# Patient Record
Sex: MALE | Race: WHITE | Employment: UNEMPLOYED | URBAN - METROPOLITAN AREA
[De-identification: names, ages, dates, MRNs, and addresses within clinical notes are randomized per-mention and may not be internally consistent; named-entity substitution may affect disease eponyms.]

---

## 2019-11-22 ENCOUNTER — OFFICE VISIT (OUTPATIENT)
Dept: PODIATRY | Age: 79
End: 2019-11-22
Payer: MEDICARE

## 2019-11-22 VITALS — HEIGHT: 71 IN | BODY MASS INDEX: 25.2 KG/M2 | WEIGHT: 180 LBS

## 2019-11-22 DIAGNOSIS — M79.671 PAIN IN RIGHT FOOT: ICD-10-CM

## 2019-11-22 DIAGNOSIS — M72.2 PLANTAR FASCIITIS OF RIGHT FOOT: Primary | ICD-10-CM

## 2019-11-22 PROCEDURE — 1036F TOBACCO NON-USER: CPT | Performed by: PODIATRIST

## 2019-11-22 PROCEDURE — G8419 CALC BMI OUT NRM PARAM NOF/U: HCPCS | Performed by: PODIATRIST

## 2019-11-22 PROCEDURE — 1123F ACP DISCUSS/DSCN MKR DOCD: CPT | Performed by: PODIATRIST

## 2019-11-22 PROCEDURE — G8427 DOCREV CUR MEDS BY ELIG CLIN: HCPCS | Performed by: PODIATRIST

## 2019-11-22 PROCEDURE — G8484 FLU IMMUNIZE NO ADMIN: HCPCS | Performed by: PODIATRIST

## 2019-11-22 PROCEDURE — 4040F PNEUMOC VAC/ADMIN/RCVD: CPT | Performed by: PODIATRIST

## 2019-11-22 PROCEDURE — 99203 OFFICE O/P NEW LOW 30 MIN: CPT | Performed by: PODIATRIST

## 2019-11-22 ASSESSMENT — ENCOUNTER SYMPTOMS
BACK PAIN: 0
DIARRHEA: 0
COLOR CHANGE: 0
NAUSEA: 0
SHORTNESS OF BREATH: 0

## 2019-12-12 ENCOUNTER — ANESTHESIA EVENT (OUTPATIENT)
Dept: ENDOSCOPY | Age: 79
End: 2019-12-12
Payer: MEDICARE

## 2019-12-12 ENCOUNTER — HOSPITAL ENCOUNTER (OUTPATIENT)
Age: 79
Setting detail: OUTPATIENT SURGERY
Discharge: HOME | End: 2019-12-12
Attending: INTERNAL MEDICINE | Admitting: INTERNAL MEDICINE
Payer: MEDICARE

## 2019-12-12 ENCOUNTER — ANESTHESIA (OUTPATIENT)
Dept: ENDOSCOPY | Age: 79
End: 2019-12-12
Payer: MEDICARE

## 2019-12-12 ENCOUNTER — HOSPITAL ENCOUNTER (OUTPATIENT)
Dept: ULTRASOUND IMAGING | Age: 79
Discharge: HOME | End: 2019-12-14
Attending: INTERNAL MEDICINE
Payer: MEDICARE

## 2019-12-12 VITALS
OXYGEN SATURATION: 96 % | HEIGHT: 70 IN | SYSTOLIC BLOOD PRESSURE: 129 MMHG | HEART RATE: 65 BPM | BODY MASS INDEX: 25.77 KG/M2 | WEIGHT: 180 LBS | DIASTOLIC BLOOD PRESSURE: 83 MMHG | RESPIRATION RATE: 18 BRPM | TEMPERATURE: 98 F

## 2019-12-12 VITALS
RESPIRATION RATE: 14 BRPM | OXYGEN SATURATION: 95 % | DIASTOLIC BLOOD PRESSURE: 67 MMHG | SYSTOLIC BLOOD PRESSURE: 95 MMHG

## 2019-12-12 DIAGNOSIS — R10.9 ABDOMINAL PAIN, UNSPECIFIED ABDOMINAL LOCATION: ICD-10-CM

## 2019-12-12 LAB — GLUCOSE BLD-MCNC: 102 MG/DL (ref 75–110)

## 2019-12-12 PROCEDURE — 76975 GI ENDOSCOPIC ULTRASOUND: CPT

## 2019-12-12 PROCEDURE — 82947 ASSAY GLUCOSE BLOOD QUANT: CPT

## 2019-12-12 PROCEDURE — 3609012400 HC EGD TRANSORAL BIOPSY SINGLE/MULTIPLE: Performed by: INTERNAL MEDICINE

## 2019-12-12 PROCEDURE — 7100000011 HC PHASE II RECOVERY - ADDTL 15 MIN: Performed by: INTERNAL MEDICINE

## 2019-12-12 PROCEDURE — 88305 TISSUE EXAM BY PATHOLOGIST: CPT

## 2019-12-12 PROCEDURE — 3700000001 HC ADD 15 MINUTES (ANESTHESIA): Performed by: INTERNAL MEDICINE

## 2019-12-12 PROCEDURE — 3700000000 HC ANESTHESIA ATTENDED CARE: Performed by: INTERNAL MEDICINE

## 2019-12-12 PROCEDURE — 3609018500 HC EGD US SCOPE W/ADJACENT STRUCTURES: Performed by: INTERNAL MEDICINE

## 2019-12-12 PROCEDURE — 2500000003 HC RX 250 WO HCPCS: Performed by: NURSE ANESTHETIST, CERTIFIED REGISTERED

## 2019-12-12 PROCEDURE — 2580000003 HC RX 258: Performed by: INTERNAL MEDICINE

## 2019-12-12 PROCEDURE — 7100000010 HC PHASE II RECOVERY - FIRST 15 MIN: Performed by: INTERNAL MEDICINE

## 2019-12-12 PROCEDURE — 6360000002 HC RX W HCPCS: Performed by: NURSE ANESTHETIST, CERTIFIED REGISTERED

## 2019-12-12 PROCEDURE — 36415 COLL VENOUS BLD VENIPUNCTURE: CPT

## 2019-12-12 PROCEDURE — 2709999900 HC NON-CHARGEABLE SUPPLY: Performed by: INTERNAL MEDICINE

## 2019-12-12 RX ORDER — PROPOFOL 10 MG/ML
INJECTION, EMULSION INTRAVENOUS PRN
Status: DISCONTINUED | OUTPATIENT
Start: 2019-12-12 | End: 2019-12-12 | Stop reason: SDUPTHER

## 2019-12-12 RX ORDER — SODIUM CHLORIDE 9 MG/ML
INJECTION, SOLUTION INTRAVENOUS CONTINUOUS
Status: DISCONTINUED | OUTPATIENT
Start: 2019-12-12 | End: 2019-12-12 | Stop reason: HOSPADM

## 2019-12-12 RX ORDER — LIDOCAINE HYDROCHLORIDE 10 MG/ML
INJECTION, SOLUTION EPIDURAL; INFILTRATION; INTRACAUDAL; PERINEURAL PRN
Status: DISCONTINUED | OUTPATIENT
Start: 2019-12-12 | End: 2019-12-12 | Stop reason: SDUPTHER

## 2019-12-12 RX ORDER — PROPOFOL 10 MG/ML
INJECTION, EMULSION INTRAVENOUS CONTINUOUS PRN
Status: DISCONTINUED | OUTPATIENT
Start: 2019-12-12 | End: 2019-12-12 | Stop reason: SDUPTHER

## 2019-12-12 RX ADMIN — LIDOCAINE HYDROCHLORIDE 50 MG: 10 INJECTION, SOLUTION EPIDURAL; INFILTRATION; INTRACAUDAL at 10:53

## 2019-12-12 RX ADMIN — PROPOFOL 100 MG: 10 INJECTION, EMULSION INTRAVENOUS at 10:56

## 2019-12-12 RX ADMIN — PROPOFOL 100 MCG/KG/MIN: 10 INJECTION, EMULSION INTRAVENOUS at 10:56

## 2019-12-12 RX ADMIN — SODIUM CHLORIDE: 9 INJECTION, SOLUTION INTRAVENOUS at 08:55

## 2019-12-12 ASSESSMENT — PAIN SCALES - GENERAL
PAINLEVEL_OUTOF10: 0

## 2019-12-12 NOTE — PROGRESS NOTES
IV STARTED R HAND 20 ANGIO PER  VIVIANA LOYA RN WITH 500 ML 0.9 NS AT [facility]

## 2019-12-12 NOTE — H&P
History and Physical    Pt Name: Sayra Mirza  MRN: [id number]  YOB: 1940  Date of evaluation: 12/12/2019  Primary Care Physician: Jim Jorge  Patient evaluated at the request of  Dr. Gabriel Vincent    Reason for evaluation:   Pancreatic mass  SUBJECTIVE:   History of Chief Complaint:      Glen Morris is a 70 y.o. male   Who has know about a incidental finding of a pancreatic mass for several years that was found on a work up for a kidney issue , quit smoking years ago  denies any unintentional weight loss, and denies any abdominal pain . He is scheduled for his first EUS today . Past Medical History      has a past medical history of Allergic rhinitis, Caffeine use, Cancer ([facility] [address] Hyperlipidemia, Hypertension, Hypertrophy of prostate with urinary obstruction and other lower urinary tract symptoms (LUTS), and Type II or unspecified type diabetes mellitus without mention of complication, not stated as uncontrolled. Past Surgical History   has a past surgical history that includes Rotator cuff repair; TURP; Prostate surgery; Cystocopy; Prostate Biopsy; joint replacement; and Total knee arthroplasty (Right). Medications   Scheduled Meds:  Continuous Infusions:   sodium chloride 100 mL/hr at 12/12/19 0855     PRN Meds:. Allergies  is allergic to sulfa antibiotics. Family History    family history includes Diabetes in his sister.     Family Status   Relation Name Status    Sister  (Not Specified)         Social History  Social History     Socioeconomic History    Marital status:      Spouse name: Not on file    Number of children: Not on file    Years of education: Not on file    Highest education level: Not on file   Occupational History    Not on file   Social Needs    Financial resource strain: Not on file    Food insecurity:     Worry: Not on file     Inability: Not on file    Transportation needs:     Medical: Not on file     Non-medical: Not on file

## 2019-12-13 LAB — SURGICAL PATHOLOGY REPORT: NORMAL

## 2023-08-28 ENCOUNTER — HOSPITAL ENCOUNTER (OUTPATIENT)
Dept: CT IMAGING | Age: 83
Discharge: HOME | End: 2023-08-30
Attending: FAMILY MEDICINE
Payer: MEDICARE

## 2023-08-28 DIAGNOSIS — E11.649 TYPE 2 DIABETES MELLITUS WITH HYPOGLYCEMIA WITHOUT COMA, UNSPECIFIED WHETHER LONG TERM INSULIN USE (HCC): ICD-10-CM

## 2023-08-28 DIAGNOSIS — R41.3 OTHER AMNESIA: ICD-10-CM

## 2023-08-28 PROCEDURE — 70450 CT HEAD/BRAIN W/O DYE: CPT

## 2023-09-13 VITALS
BODY MASS INDEX: 23.66 KG/M2 | HEART RATE: 68 BPM | OXYGEN SATURATION: 97 % | DIASTOLIC BLOOD PRESSURE: 86 MMHG | HEIGHT: 71 IN | TEMPERATURE: 96.8 F | RESPIRATION RATE: 16 BRPM | WEIGHT: 169 LBS | SYSTOLIC BLOOD PRESSURE: 142 MMHG

## 2023-09-13 DIAGNOSIS — N18.2 CHRONIC KIDNEY DISEASE, STAGE II (MILD): ICD-10-CM

## 2023-09-13 DIAGNOSIS — C64.2 MALIGNANT NEOPLASM OF KIDNEY, LEFT (HCC): ICD-10-CM

## 2023-09-13 DIAGNOSIS — E78.2 MIXED HYPERLIPIDEMIA: ICD-10-CM

## 2023-09-13 DIAGNOSIS — F41.1 GENERALIZED ANXIETY DISORDER: ICD-10-CM

## 2023-09-13 DIAGNOSIS — C61 CANCER OF PROSTATE (HCC): ICD-10-CM

## 2023-09-13 DIAGNOSIS — I10 ESSENTIAL (PRIMARY) HYPERTENSION: ICD-10-CM

## 2023-09-13 DIAGNOSIS — E11.9 DIABETES MELLITUS WITHOUT COMPLICATION (HCC): ICD-10-CM

## 2023-09-13 DIAGNOSIS — R41.3 OTHER AMNESIA: ICD-10-CM

## 2023-09-14 ENCOUNTER — OFFICE VISIT (OUTPATIENT)
Age: 83
End: 2023-09-14

## 2023-09-14 VITALS
DIASTOLIC BLOOD PRESSURE: 80 MMHG | HEIGHT: 70 IN | HEART RATE: 75 BPM | BODY MASS INDEX: 24.25 KG/M2 | SYSTOLIC BLOOD PRESSURE: 142 MMHG | WEIGHT: 169.4 LBS

## 2023-09-14 DIAGNOSIS — C80.1 CANCER (HCC): ICD-10-CM

## 2023-09-14 DIAGNOSIS — G30.9 ALZHEIMER'S DISEASE, UNSPECIFIED (CODE) (HCC): ICD-10-CM

## 2023-09-14 DIAGNOSIS — Z78.9 CAFFEINE USE: Primary | ICD-10-CM

## 2023-09-14 DIAGNOSIS — E11.22 TYPE 2 DIABETES MELLITUS WITH STAGE 2 CHRONIC KIDNEY DISEASE, WITHOUT LONG-TERM CURRENT USE OF INSULIN (HCC): ICD-10-CM

## 2023-09-14 DIAGNOSIS — E11.9 DIABETES MELLITUS WITHOUT COMPLICATION (HCC): Primary | ICD-10-CM

## 2023-09-14 DIAGNOSIS — N18.2 TYPE 2 DIABETES MELLITUS WITH STAGE 2 CHRONIC KIDNEY DISEASE, WITHOUT LONG-TERM CURRENT USE OF INSULIN (HCC): ICD-10-CM

## 2023-09-14 DIAGNOSIS — C64.2 CLEAR CELL CARCINOMA OF KIDNEY, LEFT (HCC): ICD-10-CM

## 2023-09-14 PROBLEM — J30.9 ALLERGIC RHINITIS: Status: ACTIVE | Noted: 2023-09-14

## 2023-09-14 LAB — HBA1C MFR BLD: 7.7 %

## 2023-09-14 RX ORDER — HYDROCORTISONE ACETATE 25 MG/1
1 SUPPOSITORY RECTAL
COMMUNITY
Start: 2021-08-12

## 2023-09-14 RX ORDER — DONEPEZIL HYDROCHLORIDE 5 MG/1
5 TABLET, FILM COATED ORAL NIGHTLY
Qty: 30 TABLET | Refills: 1 | Status: SHIPPED | OUTPATIENT
Start: 2023-09-14 | End: 2023-10-14

## 2023-09-14 RX ORDER — TAMSULOSIN HYDROCHLORIDE 0.4 MG/1
CAPSULE ORAL
COMMUNITY
Start: 2019-12-22

## 2023-09-14 RX ORDER — FLUTICASONE PROPIONATE 50 MCG
SPRAY, SUSPENSION (ML) NASAL
COMMUNITY
Start: 2017-03-22

## 2023-09-14 RX ORDER — METFORMIN HYDROCHLORIDE 500 MG/1
TABLET, EXTENDED RELEASE ORAL
Qty: 30 TABLET | Refills: 3 | Status: SHIPPED | OUTPATIENT
Start: 2023-09-14

## 2023-09-14 RX ORDER — BRINZOLAMIDE/BRIMONIDINE TARTRATE 10; 2 MG/ML; MG/ML
1 SUSPENSION/ DROPS OPHTHALMIC EVERY 12 HOURS
COMMUNITY
Start: 2022-09-08

## 2023-09-14 RX ORDER — LOSARTAN POTASSIUM 25 MG/1
TABLET ORAL
COMMUNITY
Start: 2017-03-08

## 2023-09-14 RX ORDER — GLUCOSAMINE SULFATE 500 MG
CAPSULE ORAL
COMMUNITY
Start: 2014-02-11

## 2023-09-14 RX ORDER — DONEPEZIL HYDROCHLORIDE 5 MG/1
TABLET, FILM COATED ORAL
COMMUNITY
Start: 2023-08-25 | End: 2023-09-14 | Stop reason: SDUPTHER

## 2023-09-14 SDOH — ECONOMIC STABILITY: INCOME INSECURITY: HOW HARD IS IT FOR YOU TO PAY FOR THE VERY BASICS LIKE FOOD, HOUSING, MEDICAL CARE, AND HEATING?: NOT HARD AT ALL

## 2023-09-14 SDOH — ECONOMIC STABILITY: FOOD INSECURITY: WITHIN THE PAST 12 MONTHS, YOU WORRIED THAT YOUR FOOD WOULD RUN OUT BEFORE YOU GOT MONEY TO BUY MORE.: NEVER TRUE

## 2023-09-14 SDOH — ECONOMIC STABILITY: FOOD INSECURITY: WITHIN THE PAST 12 MONTHS, THE FOOD YOU BOUGHT JUST DIDN'T LAST AND YOU DIDN'T HAVE MONEY TO GET MORE.: NEVER TRUE

## 2023-09-14 SDOH — ECONOMIC STABILITY: HOUSING INSECURITY
IN THE LAST 12 MONTHS, WAS THERE A TIME WHEN YOU DID NOT HAVE A STEADY PLACE TO SLEEP OR SLEPT IN A SHELTER (INCLUDING NOW)?: NO

## 2023-09-14 ASSESSMENT — PATIENT HEALTH QUESTIONNAIRE - PHQ9
2. FEELING DOWN, DEPRESSED OR HOPELESS: 0
SUM OF ALL RESPONSES TO PHQ9 QUESTIONS 1 & 2: 0
1. LITTLE INTEREST OR PLEASURE IN DOING THINGS: 0
SUM OF ALL RESPONSES TO PHQ QUESTIONS 1-9: 0

## 2023-09-14 NOTE — PROGRESS NOTES
[facility]     Date of Visit:  2023  Patient Name: Shoshana Cummings   Patient :  1940     CHIEF COMPLAINT/HPI:     Shoshana Cummings is a 82 y.o. male who presents today for an general visit to be evaluated for the following condition(s):  Chief Complaint   Patient presents with    Follow-up     6-7 weeks. Recent brain scan was done. Daughter did say that the patient forgot to take the recent medication that was ordered. Recent CT of the brain was reviewed and was unremarkable except for age-related changes the patient did not get labs done. Been taking Aricept sporadically. New lab slip will be issued  REVIEW OF SYSTEM      Review of Systems   no fever no sweats no chills no chest pain or shortness of breath no nausea vomiting no diarrhea patient remains active around the home cuts lawn etc.  Still drives locally. Occasionally he and his wife will drive down to see relatives in [place] without any  difficulty currently patient is not taking any of his medications    REVIEWED INFORMATION      Allergies   Allergen Reactions    Sulfa Antibiotics     Sulfamethoxazole-Trimethoprim Hives       Current Outpatient Medications   Medication Sig Note Dispense Refill    donepezil (ARICEPT) 5 MG tablet Take 1 tablet by mouth nightly  30 tablet 1    Coenzyme Q10 (CO Q 10) 100 MG CAPS 1 capsule with a meal Orally Once a day       fluticasone (FLONASE) 50 MCG/ACT nasal spray PLACE 2 SPRAYS IN EACH NOSTRIL DAILY Nasal       Glucosamine 500 MG CAPS TAKE 1 CAPSULE Daily Oral Daily       losartan (COZAAR) 25 MG tablet TAKE ONE TABLET BY MOUTH ONCE DAILY Oral for 90       brinzolamide-brimonidine (SIMBRINZA) 1-0.2 % SUSP Apply 1 drop to eye in the morning and 1 drop in the evening.        diclofenac (VOLTAREN) 50 MG EC tablet 1 tablet as needed Orally Twice a day with food for 30 days       hydrocortisone (ANUSOL-HC) 25 MG suppository 1 suppository       tamsulosin (FLOMAX) 0.4 MG capsule TAKE 1 CAPSULE EVERY

## 2023-09-15 ENCOUNTER — HOSPITAL ENCOUNTER (OUTPATIENT)
Age: 83
Setting detail: SPECIMEN
Discharge: HOME | End: 2023-09-15

## 2023-09-15 DIAGNOSIS — N18.2 TYPE 2 DIABETES MELLITUS WITH STAGE 2 CHRONIC KIDNEY DISEASE, WITHOUT LONG-TERM CURRENT USE OF INSULIN (HCC): ICD-10-CM

## 2023-09-15 DIAGNOSIS — C64.2 CLEAR CELL CARCINOMA OF KIDNEY, LEFT (HCC): ICD-10-CM

## 2023-09-15 DIAGNOSIS — E11.9 DIABETES MELLITUS WITHOUT COMPLICATION (HCC): ICD-10-CM

## 2023-09-15 DIAGNOSIS — E11.22 TYPE 2 DIABETES MELLITUS WITH STAGE 2 CHRONIC KIDNEY DISEASE, WITHOUT LONG-TERM CURRENT USE OF INSULIN (HCC): ICD-10-CM

## 2023-09-15 LAB
ALBUMIN SERPL-MCNC: 3.9 G/DL (ref 3.5–5.2)
ALBUMIN/GLOB SERPL: 1.1 {RATIO} (ref 1–2.5)
ALP SERPL-CCNC: 144 U/L (ref 40–129)
ALT SERPL-CCNC: 14 U/L (ref 5–41)
ANION GAP SERPL CALCULATED.3IONS-SCNC: 12 MMOL/L (ref 9–17)
AST SERPL-CCNC: 21 U/L
BILIRUB DIRECT SERPL-MCNC: 0.1 MG/DL
BILIRUB INDIRECT SERPL-MCNC: 0.5 MG/DL (ref 0–1)
BILIRUB SERPL-MCNC: 0.6 MG/DL (ref 0.3–1.2)
BUN SERPL-MCNC: 27 MG/DL (ref 8–23)
CALCIUM SERPL-MCNC: 9.1 MG/DL (ref 8.6–10.4)
CHLORIDE SERPL-SCNC: 103 MMOL/L (ref 98–107)
CHOLEST SERPL-MCNC: 202 MG/DL
CHOLESTEROL/HDL RATIO: 4.6
CO2 SERPL-SCNC: 23 MMOL/L (ref 20–31)
CREAT SERPL-MCNC: 1.6 MG/DL (ref 0.7–1.2)
ERYTHROCYTE [DISTWIDTH] IN BLOOD BY AUTOMATED COUNT: 13.3 % (ref 11.8–14.4)
GFR SERPL CREATININE-BSD FRML MDRD: 43 ML/MIN/1.73M2
GLUCOSE SERPL-MCNC: 151 MG/DL (ref 70–99)
HCT VFR BLD AUTO: 44.6 % (ref 40.7–50.3)
HDLC SERPL-MCNC: 44 MG/DL
HGB BLD-MCNC: 14.1 G/DL (ref 13–17)
LDLC SERPL CALC-MCNC: 129 MG/DL (ref 0–130)
MCH RBC QN AUTO: 30.1 PG (ref 25.2–33.5)
MCHC RBC AUTO-ENTMCNC: 31.6 G/DL (ref 28.4–34.8)
MCV RBC AUTO: 95.3 FL (ref 82.6–102.9)
NRBC BLD-RTO: 0 PER 100 WBC
PLATELET # BLD AUTO: 193 K/UL (ref 138–453)
PMV BLD AUTO: 10.5 FL (ref 8.1–13.5)
POTASSIUM SERPL-SCNC: 4.9 MMOL/L (ref 3.7–5.3)
PROT SERPL-MCNC: 7.3 G/DL (ref 6.4–8.3)
RBC # BLD AUTO: 4.68 M/UL (ref 4.21–5.77)
SODIUM SERPL-SCNC: 138 MMOL/L (ref 135–144)
T4 FREE SERPL-MCNC: 1.1 NG/DL (ref 0.9–1.7)
TRIGL SERPL-MCNC: 145 MG/DL
TSH SERPL DL<=0.05 MIU/L-ACNC: 1.33 UIU/ML (ref 0.3–5)
VIT B12 SERPL-MCNC: 322 PG/ML (ref 232–1245)
WBC OTHER # BLD: 6.2 K/UL (ref 3.5–11.3)

## 2023-09-15 NOTE — RESULT ENCOUNTER NOTE
please call daughter fasting sugar was 150.   Not  bad but it would be better if he took his glimepiride see if he can take glimepiride 1/2 tablet a day with breakfast and also the memory pill at bedtime

## 2023-11-13 DIAGNOSIS — I73.9 PERIPHERAL VASCULAR DISEASE (HCC): Primary | ICD-10-CM

## 2024-01-09 ENCOUNTER — OFFICE VISIT (OUTPATIENT)
Age: 84
End: 2024-01-09
Payer: MEDICARE

## 2024-01-09 VITALS
TEMPERATURE: 97.8 F | OXYGEN SATURATION: 98 % | HEIGHT: 70 IN | DIASTOLIC BLOOD PRESSURE: 78 MMHG | BODY MASS INDEX: 24.79 KG/M2 | HEART RATE: 74 BPM | WEIGHT: 173.13 LBS | SYSTOLIC BLOOD PRESSURE: 138 MMHG | RESPIRATION RATE: 16 BRPM

## 2024-01-09 DIAGNOSIS — Z00.00 INITIAL MEDICARE ANNUAL WELLNESS VISIT: Primary | ICD-10-CM

## 2024-01-09 DIAGNOSIS — F03.B0 MODERATE DEMENTIA WITHOUT BEHAVIORAL DISTURBANCE, PSYCHOTIC DISTURBANCE, MOOD DISTURBANCE, OR ANXIETY, UNSPECIFIED DEMENTIA TYPE (HCC): ICD-10-CM

## 2024-01-09 DIAGNOSIS — I10 ESSENTIAL (PRIMARY) HYPERTENSION: ICD-10-CM

## 2024-01-09 DIAGNOSIS — N18.2 CHRONIC KIDNEY DISEASE, STAGE II (MILD): ICD-10-CM

## 2024-01-09 DIAGNOSIS — N18.2 TYPE 2 DIABETES MELLITUS WITH STAGE 2 CHRONIC KIDNEY DISEASE, WITHOUT LONG-TERM CURRENT USE OF INSULIN (HCC): ICD-10-CM

## 2024-01-09 DIAGNOSIS — E78.2 MIXED HYPERLIPIDEMIA: ICD-10-CM

## 2024-01-09 DIAGNOSIS — E11.22 TYPE 2 DIABETES MELLITUS WITH STAGE 2 CHRONIC KIDNEY DISEASE, WITHOUT LONG-TERM CURRENT USE OF INSULIN (HCC): ICD-10-CM

## 2024-01-09 PROCEDURE — G8484 FLU IMMUNIZE NO ADMIN: HCPCS | Performed by: FAMILY MEDICINE

## 2024-01-09 PROCEDURE — 3078F DIAST BP <80 MM HG: CPT | Performed by: FAMILY MEDICINE

## 2024-01-09 PROCEDURE — G0438 PPPS, INITIAL VISIT: HCPCS | Performed by: FAMILY MEDICINE

## 2024-01-09 PROCEDURE — 1123F ACP DISCUSS/DSCN MKR DOCD: CPT | Performed by: FAMILY MEDICINE

## 2024-01-09 PROCEDURE — 3075F SYST BP GE 130 - 139MM HG: CPT | Performed by: FAMILY MEDICINE

## 2024-01-09 RX ORDER — METFORMIN HYDROCHLORIDE 500 MG/1
TABLET, EXTENDED RELEASE ORAL
Qty: 30 TABLET | Refills: 5 | Status: SHIPPED | OUTPATIENT
Start: 2024-01-09

## 2024-01-09 RX ORDER — GLIMEPIRIDE 1 MG/1
1 TABLET ORAL
Qty: 30 TABLET | Refills: 5 | Status: SHIPPED | OUTPATIENT
Start: 2024-01-09

## 2024-01-09 RX ORDER — LOSARTAN POTASSIUM 25 MG/1
25 TABLET ORAL DAILY
Qty: 30 TABLET | Refills: 5 | Status: SHIPPED | OUTPATIENT
Start: 2024-01-09

## 2024-01-09 RX ORDER — PRAVASTATIN SODIUM 40 MG
40 TABLET ORAL
Qty: 30 TABLET | Refills: 5 | Status: SHIPPED | OUTPATIENT
Start: 2024-01-10

## 2024-01-09 RX ORDER — DONEPEZIL HYDROCHLORIDE 5 MG/1
5 TABLET, FILM COATED ORAL NIGHTLY
Qty: 30 TABLET | Refills: 5 | Status: SHIPPED | OUTPATIENT
Start: 2024-01-09 | End: 2024-02-08

## 2024-01-09 ASSESSMENT — PATIENT HEALTH QUESTIONNAIRE - PHQ9
SUM OF ALL RESPONSES TO PHQ QUESTIONS 1-9: 0
1. LITTLE INTEREST OR PLEASURE IN DOING THINGS: 0
SUM OF ALL RESPONSES TO PHQ9 QUESTIONS 1 & 2: 0
2. FEELING DOWN, DEPRESSED OR HOPELESS: 0
SUM OF ALL RESPONSES TO PHQ QUESTIONS 1-9: 0

## 2024-01-09 ASSESSMENT — LIFESTYLE VARIABLES
HOW MANY STANDARD DRINKS CONTAINING ALCOHOL DO YOU HAVE ON A TYPICAL DAY: 3 OR 4
HOW OFTEN DO YOU HAVE A DRINK CONTAINING ALCOHOL: 2-4 TIMES A MONTH

## 2024-01-09 NOTE — PROGRESS NOTES
Medicare Annual Wellness Visit    Stevan Dolan is here for Medicare AWV (Patient is here today for an AWV)    Assessment & Plan   Initial Medicare annual wellness visit  Recommendations for Preventive Services Due: see orders and patient instructions/AVS.  Recommended screening schedule for the next 5-10 years is provided to the patient in written form: see Patient Instructions/AVS.     Return in 3 months (on 4/9/2024).     Subjective       Patient's complete Health Risk Assessment and screening values have been reviewed and are found in Flowsheets. The following problems were reviewed today and where indicated follow up appointments were made and/or referrals ordered.    Positive Risk Factor Screenings with Interventions:                 Dentist Screen:  Have you seen the dentist within the past year?: (!) No    Intervention:  Patient reminded to get regular eye exams contact information for local ophthalmologist given to patient and his friend.  Patient has not been taking medicine for may be a month or so nobody knows for certain     Vision Screen:  Do you have difficulty driving, watching TV, or doing any of your daily activities because of your eyesight?: No  Have you had an eye exam within the past year?: (!) No  No results found.    Interventions:  Patient reminded to get regular eye exams contact information given check feet daily he has no neuropathy      Advanced Directives:  Do you have a Living Will?: (!) No    Intervention:                       Objective   Vitals:    01/09/24 0923   BP: 138/78   Pulse: 74   Resp: 16   Temp: 97.8 °F (36.6 °C)   SpO2: 98%   Weight: 78.5 kg (173 lb 2 oz)   Height: 1.778 m (5' 10\")      Body mass index is 24.84 kg/m².   Blood pressure 138/78 weight 173 pounds.  Patient is in no distress he is a little vague on details however.  Neck no masses trachea midline chest clear heart regular rhythm no murmur gallop or click.  Abdomen soft nontender no organomegaly no peripheral

## 2024-01-09 NOTE — PATIENT INSTRUCTIONS
benefits include a comprehensive review of your medical history including lifestyle, illnesses that may run in your family, and various assessments and screenings as appropriate.    After reviewing your medical record and screening and assessments performed today your provider may have ordered immunizations, labs, imaging, and/or referrals for you.  A list of these orders (if applicable) as well as your Preventive Care list are included within your After Visit Summary for your review.    Other Preventive Recommendations:    A preventive eye exam performed by an eye specialist is recommended every 1-2 years to screen for glaucoma; cataracts, macular degeneration, and other eye disorders.  A preventive dental visit is recommended every 6 months.  Try to get at least 150 minutes of exercise per week or 10,000 steps per day on a pedometer .  Order or download the FREE \"Exercise & Physical Activity: Your Everyday Guide\" from [employer]. Call 1-356.135.9132 or search [employer] online.  You need 3538-6137 mg of calcium and 5975-3085 IU of vitamin D per day. It is possible to meet your calcium requirement with diet alone, but a vitamin D supplement is usually necessary to meet this goal.  When exposed to the sun, use a sunscreen that protects against both UVA and UVB radiation with an SPF of 30 or greater. Reapply every 2 to 3 hours or after sweating, drying off with a towel, or swimming.  Always wear a seat belt when traveling in a car. Always wear a helmet when riding a bicycle or motorcycle.

## 2024-02-28 ENCOUNTER — TELEPHONE (OUTPATIENT)
Age: 84
End: 2024-02-28

## 2024-02-28 DIAGNOSIS — E11.22 TYPE 2 DIABETES MELLITUS WITH STAGE 2 CHRONIC KIDNEY DISEASE, WITHOUT LONG-TERM CURRENT USE OF INSULIN (HCC): Primary | ICD-10-CM

## 2024-02-28 DIAGNOSIS — E78.2 MIXED HYPERLIPIDEMIA: ICD-10-CM

## 2024-02-28 DIAGNOSIS — I10 ESSENTIAL (PRIMARY) HYPERTENSION: ICD-10-CM

## 2024-02-28 DIAGNOSIS — R53.83 OTHER FATIGUE: ICD-10-CM

## 2024-02-28 DIAGNOSIS — C64.2 CLEAR CELL CARCINOMA OF KIDNEY, LEFT (HCC): ICD-10-CM

## 2024-02-28 DIAGNOSIS — N18.2 TYPE 2 DIABETES MELLITUS WITH STAGE 2 CHRONIC KIDNEY DISEASE, WITHOUT LONG-TERM CURRENT USE OF INSULIN (HCC): Primary | ICD-10-CM

## 2024-02-28 NOTE — TELEPHONE ENCOUNTER
Patients daughter called stating patient has not been taking his medications consistently and she would like to discuss this and other issues with her father with you. Patient is wondering if you would like to schedule an appt with her she said she is willing to pay for an office visit to discuss these issues with you or if you want to call her that works as well. She would like to discuss \"issues\" with you before making another appt with patient.   Please advise Maci. [phone]
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
## 2024-03-28 ENCOUNTER — TELEPHONE (OUTPATIENT)
Age: 84
End: 2024-03-28

## 2024-04-09 ENCOUNTER — HOSPITAL ENCOUNTER (OUTPATIENT)
Age: 84
Setting detail: SPECIMEN
Discharge: HOME | End: 2024-04-09

## 2024-04-09 DIAGNOSIS — N18.2 TYPE 2 DIABETES MELLITUS WITH STAGE 2 CHRONIC KIDNEY DISEASE, WITHOUT LONG-TERM CURRENT USE OF INSULIN (HCC): ICD-10-CM

## 2024-04-09 DIAGNOSIS — E78.2 MIXED HYPERLIPIDEMIA: ICD-10-CM

## 2024-04-09 DIAGNOSIS — R53.83 OTHER FATIGUE: ICD-10-CM

## 2024-04-09 DIAGNOSIS — C64.2 CLEAR CELL CARCINOMA OF KIDNEY, LEFT (HCC): ICD-10-CM

## 2024-04-09 DIAGNOSIS — E11.22 TYPE 2 DIABETES MELLITUS WITH STAGE 2 CHRONIC KIDNEY DISEASE, WITHOUT LONG-TERM CURRENT USE OF INSULIN (HCC): ICD-10-CM

## 2024-04-09 DIAGNOSIS — I10 ESSENTIAL (PRIMARY) HYPERTENSION: ICD-10-CM

## 2024-04-09 LAB
ALBUMIN SERPL-MCNC: 4.1 G/DL (ref 3.5–5.2)
ALBUMIN/GLOB SERPL: 1 {RATIO} (ref 1–2.5)
ALP SERPL-CCNC: 113 U/L (ref 40–129)
ALT SERPL-CCNC: 18 U/L (ref 10–50)
ANION GAP SERPL CALCULATED.3IONS-SCNC: 10 MMOL/L (ref 9–16)
AST SERPL-CCNC: 29 U/L (ref 10–50)
BILIRUB DIRECT SERPL-MCNC: <0.2 MG/DL (ref 0–0.3)
BILIRUB INDIRECT SERPL-MCNC: 0.4 MG/DL (ref 0–1)
BILIRUB SERPL-MCNC: 0.6 MG/DL (ref 0–1.2)
BUN SERPL-MCNC: 25 MG/DL (ref 8–23)
CALCIUM SERPL-MCNC: 9.3 MG/DL (ref 8.6–10.4)
CHLORIDE SERPL-SCNC: 109 MMOL/L (ref 98–107)
CHOLEST SERPL-MCNC: 180 MG/DL (ref 0–199)
CHOLESTEROL/HDL RATIO: 4
CO2 SERPL-SCNC: 24 MMOL/L (ref 20–31)
CREAT SERPL-MCNC: 1.6 MG/DL (ref 0.7–1.2)
ERYTHROCYTE [DISTWIDTH] IN BLOOD BY AUTOMATED COUNT: 13.1 % (ref 11.8–14.4)
EST. AVERAGE GLUCOSE BLD GHB EST-MCNC: 157 MG/DL
GFR SERPL CREATININE-BSD FRML MDRD: 42 ML/MIN/1.73M2
GLUCOSE SERPL-MCNC: 111 MG/DL (ref 74–99)
HBA1C MFR BLD: 7.1 % (ref 4–6)
HCT VFR BLD AUTO: 42.1 % (ref 40.7–50.3)
HDLC SERPL-MCNC: 42 MG/DL
HGB BLD-MCNC: 13.9 G/DL (ref 13–17)
LDLC SERPL CALC-MCNC: 102 MG/DL (ref 0–100)
MCH RBC QN AUTO: 30.8 PG (ref 25.2–33.5)
MCHC RBC AUTO-ENTMCNC: 33 G/DL (ref 28.4–34.8)
MCV RBC AUTO: 93.3 FL (ref 82.6–102.9)
NRBC BLD-RTO: 0 PER 100 WBC
PLATELET # BLD AUTO: 207 K/UL (ref 138–453)
PMV BLD AUTO: 10.6 FL (ref 8.1–13.5)
POTASSIUM SERPL-SCNC: 4.9 MMOL/L (ref 3.7–5.3)
PROT SERPL-MCNC: 7.4 G/DL (ref 6.6–8.7)
RBC # BLD AUTO: 4.51 M/UL (ref 4.21–5.77)
SODIUM SERPL-SCNC: 143 MMOL/L (ref 136–145)
T4 FREE SERPL-MCNC: 1 NG/DL (ref 0.92–1.68)
TRIGL SERPL-MCNC: 180 MG/DL
TSH SERPL DL<=0.05 MIU/L-ACNC: 1.66 UIU/ML (ref 0.27–4.2)
VLDLC SERPL CALC-MCNC: 36 MG/DL
WBC OTHER # BLD: 6.1 K/UL (ref 3.5–11.3)

## 2024-04-10 ENCOUNTER — OFFICE VISIT (OUTPATIENT)
Age: 84
End: 2024-04-10
Payer: MEDICARE

## 2024-04-10 VITALS
WEIGHT: 176.9 LBS | HEART RATE: 74 BPM | DIASTOLIC BLOOD PRESSURE: 78 MMHG | SYSTOLIC BLOOD PRESSURE: 130 MMHG | OXYGEN SATURATION: 98 % | BODY MASS INDEX: 25.38 KG/M2 | TEMPERATURE: 98.2 F | RESPIRATION RATE: 12 BRPM

## 2024-04-10 DIAGNOSIS — E78.2 MIXED HYPERLIPIDEMIA: ICD-10-CM

## 2024-04-10 DIAGNOSIS — N18.2 CHRONIC KIDNEY DISEASE, STAGE II (MILD): ICD-10-CM

## 2024-04-10 DIAGNOSIS — N18.2 TYPE 2 DIABETES MELLITUS WITH STAGE 2 CHRONIC KIDNEY DISEASE, WITHOUT LONG-TERM CURRENT USE OF INSULIN (HCC): Primary | ICD-10-CM

## 2024-04-10 DIAGNOSIS — E11.22 TYPE 2 DIABETES MELLITUS WITH STAGE 2 CHRONIC KIDNEY DISEASE, WITHOUT LONG-TERM CURRENT USE OF INSULIN (HCC): Primary | ICD-10-CM

## 2024-04-10 DIAGNOSIS — F03.B0 MODERATE DEMENTIA WITHOUT BEHAVIORAL DISTURBANCE, PSYCHOTIC DISTURBANCE, MOOD DISTURBANCE, OR ANXIETY, UNSPECIFIED DEMENTIA TYPE (HCC): ICD-10-CM

## 2024-04-10 DIAGNOSIS — I10 ESSENTIAL (PRIMARY) HYPERTENSION: ICD-10-CM

## 2024-04-10 PROCEDURE — 3051F HG A1C>EQUAL 7.0%<8.0%: CPT | Performed by: FAMILY MEDICINE

## 2024-04-10 PROCEDURE — G8427 DOCREV CUR MEDS BY ELIG CLIN: HCPCS | Performed by: FAMILY MEDICINE

## 2024-04-10 PROCEDURE — 3075F SYST BP GE 130 - 139MM HG: CPT | Performed by: FAMILY MEDICINE

## 2024-04-10 PROCEDURE — 1036F TOBACCO NON-USER: CPT | Performed by: FAMILY MEDICINE

## 2024-04-10 PROCEDURE — 99213 OFFICE O/P EST LOW 20 MIN: CPT | Performed by: FAMILY MEDICINE

## 2024-04-10 PROCEDURE — 3078F DIAST BP <80 MM HG: CPT | Performed by: FAMILY MEDICINE

## 2024-04-10 PROCEDURE — G8419 CALC BMI OUT NRM PARAM NOF/U: HCPCS | Performed by: FAMILY MEDICINE

## 2024-04-10 PROCEDURE — 1123F ACP DISCUSS/DSCN MKR DOCD: CPT | Performed by: FAMILY MEDICINE

## 2024-04-10 NOTE — PROGRESS NOTES
[facility] [place]     Date of Visit:  4/10/2024  Patient Name: Stevan Dolan   Patient :  1940     CHIEF COMPLAINT/HPI:     Stevan Dolan is a 83 y.o. male who presents today for an general visit to be evaluated for the following condition(s):  Chief Complaint   Patient presents with    Diabetes   Patient here for follow-up regarding is unsure medicines or not written list will be given to him today and he will check this at home in any event his blood tests are pretty good  Hemoglobin A1C   Date Value Ref Range Status   2024 7.1 (H) 4.0 - 6.0 % Final   2023 7.7 % Final       Lab Results   Component Value Date/Time     2024 08:25 AM    K 4.9 2024 08:25 AM     2024 08:25 AM    CO2 24 2024 08:25 AM    BUN 25 2024 08:25 AM    CREATININE 1.6 2024 08:25 AM    GLUCOSE 111 2024 08:25 AM    CALCIUM 9.3 2024 08:25 AM    LABGLOM 42 2024 08:25 AM        No results found for: \"MALBCR\"     Lab Results   Component Value Date    CHOL 180 2024    TRIG 180 (H) 2024    HDL 42 2024    LDLCHOLESTEROL 102 (H) 2024    VLDL 36 2024    CHOLHDLRATIO 4.0 2024        Lab Results   Component Value Date    WBC 6.1 2024    HGB 13.9 2024    HCT 42.1 2024    MCV 93.3 2024     2024       Lab Results   Component Value Date    ALT 18 2024    AST 29 2024    ALKPHOS 113 2024    BILITOT 0.6 2024        Lab Results   Component Value Date    TSH 1.66 2024        REVIEW OF SYSTEM      Review of Systems  Short-term memory remains an issue physically feels well.  No fever no sweats no chills no chest pain no shortness of breath no nausea no vomiting no diarrhea no edema issues he reports that he has good feeling in his feet    REVIEWED INFORMATION      Allergies   Allergen Reactions    Mixed Ragweed     Sulfa Antibiotics     Sulfamethoxazole-Trimethoprim Hives

## 2024-05-20 RX ORDER — METFORMIN HYDROCHLORIDE 500 MG/1
TABLET, EXTENDED RELEASE ORAL
Qty: 90 TABLET | Refills: 1 | Status: SHIPPED | OUTPATIENT
Start: 2024-05-20

## 2024-05-20 RX ORDER — GLIMEPIRIDE 1 MG/1
1 TABLET ORAL
Qty: 90 TABLET | Refills: 1 | Status: SHIPPED | OUTPATIENT
Start: 2024-05-20

## 2024-05-20 NOTE — TELEPHONE ENCOUNTER
Stevan Dolan is calling to request a refill on the following medication(s):    Medication Request:  Requested Prescriptions     Pending Prescriptions Disp Refills    metFORMIN (GLUCOPHAGE-XR) 500 MG extended release tablet [Pharmacy Med Name: METFORMIN HCL  MG TABLET] 90 tablet      Sig: TAKE 1 TABLET BY MOUTH EVERY MORNING FOR DIABETES    glimepiride (AMARYL) 1 MG tablet [Pharmacy Med Name: GLIMEPIRIDE 1 MG TABLET] 90 tablet      Sig: TAKE ONE TABLET BY MOUTH EVERY MORNING BEFORE BREAKFAST       Last Visit Date (If Applicable):  4/10/2024    Next Visit Date:    7/11/2024

## 2024-05-22 ENCOUNTER — OFFICE VISIT (OUTPATIENT)
Dept: PRIMARY CARE CLINIC | Age: 84
End: 2024-05-22
Payer: MEDICARE

## 2024-05-22 VITALS
BODY MASS INDEX: 24.25 KG/M2 | WEIGHT: 169.4 LBS | DIASTOLIC BLOOD PRESSURE: 82 MMHG | OXYGEN SATURATION: 97 % | HEART RATE: 73 BPM | HEIGHT: 70 IN | SYSTOLIC BLOOD PRESSURE: 142 MMHG | TEMPERATURE: 97.6 F
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
## 2024-05-22 DIAGNOSIS — G30.9 ALZHEIMER'S DISEASE, UNSPECIFIED (CODE) (HCC): ICD-10-CM

## 2024-05-22 DIAGNOSIS — M25.562 PAIN AND SWELLING OF LEFT KNEE: ICD-10-CM

## 2024-05-22 DIAGNOSIS — M25.462 PAIN AND SWELLING OF LEFT KNEE: ICD-10-CM

## 2024-05-22 PROCEDURE — 1036F TOBACCO NON-USER: CPT | Performed by: NURSE PRACTITIONER

## 2024-05-22 PROCEDURE — G8420 CALC BMI NORM PARAMETERS: HCPCS | Performed by: NURSE PRACTITIONER

## 2024-05-22 PROCEDURE — 3077F SYST BP >= 140 MM HG: CPT | Performed by: NURSE PRACTITIONER

## 2024-05-22 PROCEDURE — 1123F ACP DISCUSS/DSCN MKR DOCD: CPT | Performed by: NURSE PRACTITIONER

## 2024-05-22 PROCEDURE — 3079F DIAST BP 80-89 MM HG: CPT | Performed by: NURSE PRACTITIONER

## 2024-05-22 PROCEDURE — 99213 OFFICE O/P EST LOW 20 MIN: CPT | Performed by: NURSE PRACTITIONER

## 2024-05-22 PROCEDURE — G8427 DOCREV CUR MEDS BY ELIG CLIN: HCPCS | Performed by: NURSE PRACTITIONER

## 2024-05-22 NOTE — PROGRESS NOTES
[facility] WALK-IN  [address],  [address]  [facility] [address]  Dept: [phone]    Stevan Dolan is a 83 y.o. male Established patient, who presents to the walk-in clinic today with conditions/complaints as noted below:    Chief Complaint   Patient presents with    Knee Pain     Patient present with left knee pain from a fall that took place today while he was at home. Injuries present to both elbows as well. Denies hitting head.          HPI:     HPI      Stevan presents to the office today with his daughter with concerns of a fall in his workshop on to his left knee. He thinks he fell from standing onto concrete. He has dementia and is not sure what happened. He is not taking any of his medications. He lives at home with his wife.     Past Medical History:   Diagnosis Date    Allergic rhinitis     Anxiety     Caffeine use     1 coffee, 1 cola    Cancer (HCC)     Diverticulosis, sigmoid     Hyperlipidemia     Hypertension     Hypertrophy of prostate with urinary obstruction and other lower urinary tract symptoms (LUTS)     Peripheral vascular disease (HCC)     Type 2 diabetes mellitus without complication (HCC)        Current Outpatient Medications   Medication Sig Dispense Refill    metFORMIN (GLUCOPHAGE-XR) 500 MG extended release tablet TAKE 1 TABLET BY MOUTH EVERY MORNING FOR DIABETES (Patient not taking: Reported on 5/22/2024) 90 tablet 1    glimepiride (AMARYL) 1 MG tablet TAKE ONE TABLET BY MOUTH EVERY MORNING BEFORE BREAKFAST (Patient not taking: Reported on 5/22/2024) 90 tablet 1    donepezil (ARICEPT) 5 MG tablet Take 1 tablet by mouth nightly (Patient not taking: Reported on 5/22/2024) 30 tablet 5    losartan (COZAAR) 25 MG tablet Take 1 tablet by mouth daily (Patient not taking: Reported on 5/22/2024) 30 tablet 5    pravastatin (PRAVACHOL) 40 MG tablet Take 1 tablet by mouth three times a week (Patient not taking:

## 2024-05-23 ENCOUNTER — OFFICE VISIT (OUTPATIENT)
Dept: ORTHOPEDIC SURGERY | Age: 84
End: 2024-05-23
Payer: MEDICARE

## 2024-05-23 VITALS — WEIGHT: 169 LBS | BODY MASS INDEX: 24.2 KG/M2 | HEIGHT: 70 IN

## 2024-05-23 DIAGNOSIS — M25.462 EFFUSION OF LEFT KNEE: Primary | ICD-10-CM

## 2024-05-23 DIAGNOSIS — M25.562 ACUTE PAIN OF LEFT KNEE: ICD-10-CM

## 2024-05-23 PROCEDURE — 20610 DRAIN/INJ JOINT/BURSA W/O US: CPT

## 2024-05-23 PROCEDURE — G8420 CALC BMI NORM PARAMETERS: HCPCS

## 2024-05-23 PROCEDURE — G8427 DOCREV CUR MEDS BY ELIG CLIN: HCPCS

## 2024-05-23 PROCEDURE — 1123F ACP DISCUSS/DSCN MKR DOCD: CPT

## 2024-05-23 PROCEDURE — 1036F TOBACCO NON-USER: CPT

## 2024-05-23 PROCEDURE — 99203 OFFICE O/P NEW LOW 30 MIN: CPT

## 2024-05-23 RX ORDER — LIDOCAINE HYDROCHLORIDE 10 MG/ML
2 INJECTION, SOLUTION INFILTRATION; PERINEURAL ONCE
Status: SHIPPED | OUTPATIENT
Start: 2024-05-23

## 2024-05-23 ASSESSMENT — ENCOUNTER SYMPTOMS: COLOR CHANGE: 0

## 2024-05-23 NOTE — PROGRESS NOTES
BIOPSY performed by Amador Boo MD at [facility]       Current Medications:   Current Outpatient Medications   Medication Sig Dispense Refill    metFORMIN (GLUCOPHAGE-XR) 500 MG extended release tablet TAKE 1 TABLET BY MOUTH EVERY MORNING FOR DIABETES (Patient not taking: Reported on 5/22/2024) 90 tablet 1    glimepiride (AMARYL) 1 MG tablet TAKE ONE TABLET BY MOUTH EVERY MORNING BEFORE BREAKFAST (Patient not taking: Reported on 5/22/2024) 90 tablet 1    donepezil (ARICEPT) 5 MG tablet Take 1 tablet by mouth nightly (Patient not taking: Reported on 5/22/2024) 30 tablet 5    losartan (COZAAR) 25 MG tablet Take 1 tablet by mouth daily (Patient not taking: Reported on 5/22/2024) 30 tablet 5    pravastatin (PRAVACHOL) 40 MG tablet Take 1 tablet by mouth three times a week (Patient not taking: Reported on 5/22/2024) 30 tablet 5     Current Facility-Administered Medications   Medication Dose Route Frequency Provider Last Rate Last Admin    lidocaine 1 % injection 2 mL  2 mL Intra-artICUlar Once Whitney Kraft PA-C           Allergies:    Mixed ragweed, Sulfa antibiotics, and Sulfamethoxazole-trimethoprim    Social History:   Social History     Socioeconomic History    Marital status:      Spouse name: Not on file    Number of children: Not on file    Years of education: Not on file    Highest education level: Not on file   Occupational History    Not on file   Tobacco Use    Smoking status: Former     Current packs/day: 1.00     Average packs/day: 1 pack/day for 20.0 years (20.0 ttl pk-yrs)     Types: Cigarettes    Smokeless tobacco: Never   Substance and Sexual Activity    Alcohol use: Yes     Comment: social    Drug use: No    Sexual activity: Not on file   Other Topics Concern    Not on file   Social History Narrative    Not on file     Social Determinants of Health     Financial Resource Strain: Low Risk  (9/14/2023)    Overall Financial Resource Strain (CARDIA)     Difficulty of Paying Living

## 2024-05-24 ENCOUNTER — TELEPHONE (OUTPATIENT)
Dept: ORTHOPEDIC SURGERY | Age: 84
End: 2024-05-24

## 2024-05-24 ENCOUNTER — HOSPITAL ENCOUNTER (OUTPATIENT)
Dept: MRI IMAGING | Age: 84
End: 2024-05-24
Payer: MEDICARE

## 2024-05-24 DIAGNOSIS — M25.462 EFFUSION OF LEFT KNEE: ICD-10-CM

## 2024-05-24 DIAGNOSIS — M25.562 ACUTE PAIN OF LEFT KNEE: ICD-10-CM

## 2024-05-24 PROCEDURE — 73721 MRI JNT OF LWR EXTRE W/O DYE: CPT

## 2024-05-24 NOTE — TELEPHONE ENCOUNTER
Please call Immanuel with patient MRI results, he is already schedule for surgery on Tuesday with Dr. Blas, she is aware of the instructions.      Also she is concerned about her father being home alone to recover from this surgery, I did mention that he might be admitted for the night since the surgery was so late in the evening.  She stated that he has some slight dementia, I informed her to discuss with  for additional help or questions.

## 2024-05-28 ENCOUNTER — ANESTHESIA EVENT (OUTPATIENT)
Dept: OPERATING ROOM | Age: 84
End: 2024-05-28
Payer: MEDICARE

## 2024-05-28 ENCOUNTER — HOSPITAL ENCOUNTER (OUTPATIENT)
Age: 84
Setting detail: OUTPATIENT SURGERY
Discharge: HOME | End: 2024-05-28
Attending: ORTHOPAEDIC SURGERY | Admitting: ORTHOPAEDIC SURGERY
Payer: MEDICARE

## 2024-05-28 ENCOUNTER — ANESTHESIA (OUTPATIENT)
Dept: OPERATING ROOM | Age: 84
End: 2024-05-28
Payer: MEDICARE

## 2024-05-28 VITALS
SYSTOLIC BLOOD PRESSURE: 167 MMHG | TEMPERATURE: 97.7 F | BODY MASS INDEX: 23.03 KG/M2 | OXYGEN SATURATION: 95 % | WEIGHT: 170 LBS | RESPIRATION RATE: 13 BRPM | DIASTOLIC BLOOD PRESSURE: 83 MMHG | HEART RATE: 65 BPM | HEIGHT: 72 IN

## 2024-05-28 DIAGNOSIS — G89.18 POST-OP PAIN: Primary | ICD-10-CM

## 2024-05-28 PROBLEM — S76.112A RUPTURE OF LEFT QUADRICEPS MUSCLE: Status: ACTIVE | Noted: 2024-05-28

## 2024-05-28 LAB — GLUCOSE BLD-MCNC: 116 MG/DL (ref 75–110)

## 2024-05-28 PROCEDURE — 82947 ASSAY GLUCOSE BLOOD QUANT: CPT

## 2024-05-28 PROCEDURE — 7100000010 HC PHASE II RECOVERY - FIRST 15 MIN: Performed by: ORTHOPAEDIC SURGERY

## 2024-05-28 PROCEDURE — 6360000002 HC RX W HCPCS: Performed by: ORTHOPAEDIC SURGERY

## 2024-05-28 PROCEDURE — C1713 ANCHOR/SCREW BN/BN,TIS/BN: HCPCS | Performed by: ORTHOPAEDIC SURGERY

## 2024-05-28 PROCEDURE — 2500000003 HC RX 250 WO HCPCS: Performed by: ORTHOPAEDIC SURGERY

## 2024-05-28 PROCEDURE — 7100000001 HC PACU RECOVERY - ADDTL 15 MIN: Performed by: ORTHOPAEDIC SURGERY

## 2024-05-28 PROCEDURE — 3700000000 HC ANESTHESIA ATTENDED CARE: Performed by: ORTHOPAEDIC SURGERY

## 2024-05-28 PROCEDURE — 2709999900 HC NON-CHARGEABLE SUPPLY: Performed by: ORTHOPAEDIC SURGERY

## 2024-05-28 PROCEDURE — 7100000011 HC PHASE II RECOVERY - ADDTL 15 MIN: Performed by: ORTHOPAEDIC SURGERY

## 2024-05-28 PROCEDURE — 7100000000 HC PACU RECOVERY - FIRST 15 MIN: Performed by: ORTHOPAEDIC SURGERY

## 2024-05-28 PROCEDURE — 6360000002 HC RX W HCPCS: Performed by: NURSE ANESTHETIST, CERTIFIED REGISTERED

## 2024-05-28 PROCEDURE — 2580000003 HC RX 258: Performed by: ANESTHESIOLOGY

## 2024-05-28 PROCEDURE — 3600000012 HC SURGERY LEVEL 2 ADDTL 15MIN: Performed by: ORTHOPAEDIC SURGERY

## 2024-05-28 PROCEDURE — 2500000003 HC RX 250 WO HCPCS: Performed by: NURSE ANESTHETIST, CERTIFIED REGISTERED

## 2024-05-28 PROCEDURE — 3600000002 HC SURGERY LEVEL 2 BASE: Performed by: ORTHOPAEDIC SURGERY

## 2024-05-28 PROCEDURE — 3700000001 HC ADD 15 MINUTES (ANESTHESIA): Performed by: ORTHOPAEDIC SURGERY

## 2024-05-28 PROCEDURE — 6360000002 HC RX W HCPCS: Performed by: ANESTHESIOLOGY

## 2024-05-28 DEVICE — IB KIT, PLUS, BC, W/ CC FT AND JUMPSTART
Type: IMPLANTABLE DEVICE | Site: FEMUR | Status: FUNCTIONAL
Brand: ARTHREX®

## 2024-05-28 RX ORDER — NALOXONE HYDROCHLORIDE 0.4 MG/ML
INJECTION, SOLUTION INTRAMUSCULAR; INTRAVENOUS; SUBCUTANEOUS PRN
Status: DISCONTINUED | OUTPATIENT
Start: 2024-05-28 | End: 2024-05-28 | Stop reason: HOSPADM

## 2024-05-28 RX ORDER — HALOPERIDOL 5 MG/ML
1 INJECTION INTRAMUSCULAR
Status: DISCONTINUED | OUTPATIENT
Start: 2024-05-28 | End: 2024-05-28 | Stop reason: HOSPADM

## 2024-05-28 RX ORDER — SODIUM CHLORIDE 0.9 % (FLUSH) 0.9 %
5-40 SYRINGE (ML) INJECTION PRN
Status: DISCONTINUED | OUTPATIENT
Start: 2024-05-28 | End: 2024-05-28 | Stop reason: HOSPADM

## 2024-05-28 RX ORDER — DEXAMETHASONE SODIUM PHOSPHATE 10 MG/ML
INJECTION, SOLUTION INTRAMUSCULAR; INTRAVENOUS PRN
Status: DISCONTINUED | OUTPATIENT
Start: 2024-05-28 | End: 2024-05-28 | Stop reason: SDUPTHER

## 2024-05-28 RX ORDER — BUPIVACAINE HYDROCHLORIDE AND EPINEPHRINE 2.5; 5 MG/ML; UG/ML
INJECTION, SOLUTION EPIDURAL; INFILTRATION; INTRACAUDAL; PERINEURAL
Status: DISCONTINUED
Start: 2024-05-28 | End: 2024-05-28 | Stop reason: HOSPADM

## 2024-05-28 RX ORDER — LIDOCAINE HYDROCHLORIDE 20 MG/ML
INJECTION, SOLUTION EPIDURAL; INFILTRATION; INTRACAUDAL; PERINEURAL PRN
Status: DISCONTINUED | OUTPATIENT
Start: 2024-05-28 | End: 2024-05-28 | Stop reason: SDUPTHER

## 2024-05-28 RX ORDER — PRAVASTATIN SODIUM 40 MG
40 TABLET ORAL DAILY
COMMUNITY

## 2024-05-28 RX ORDER — OXYCODONE HYDROCHLORIDE 5 MG/1
5 TABLET ORAL
Status: DISCONTINUED | OUTPATIENT
Start: 2024-05-28 | End: 2024-05-28 | Stop reason: HOSPADM

## 2024-05-28 RX ORDER — ONDANSETRON 2 MG/ML
INJECTION INTRAMUSCULAR; INTRAVENOUS PRN
Status: DISCONTINUED | OUTPATIENT
Start: 2024-05-28 | End: 2024-05-28 | Stop reason: SDUPTHER

## 2024-05-28 RX ORDER — DOCUSATE SODIUM 100 MG/1
100 CAPSULE, LIQUID FILLED ORAL 2 TIMES DAILY PRN
Qty: 20 CAPSULE | Refills: 0 | Status: SHIPPED | OUTPATIENT
Start: 2024-05-28

## 2024-05-28 RX ORDER — SODIUM CHLORIDE 9 MG/ML
INJECTION, SOLUTION INTRAVENOUS CONTINUOUS
Status: DISCONTINUED | OUTPATIENT
Start: 2024-05-28 | End: 2024-05-28 | Stop reason: HOSPADM

## 2024-05-28 RX ORDER — FENTANYL CITRATE 50 UG/ML
INJECTION, SOLUTION INTRAMUSCULAR; INTRAVENOUS PRN
Status: DISCONTINUED | OUTPATIENT
Start: 2024-05-28 | End: 2024-05-28 | Stop reason: SDUPTHER

## 2024-05-28 RX ORDER — METOPROLOL TARTRATE 1 MG/ML
INJECTION, SOLUTION INTRAVENOUS PRN
Status: DISCONTINUED | OUTPATIENT
Start: 2024-05-28 | End: 2024-05-28 | Stop reason: SDUPTHER

## 2024-05-28 RX ORDER — BUPIVACAINE HYDROCHLORIDE AND EPINEPHRINE 2.5; 5 MG/ML; UG/ML
INJECTION, SOLUTION EPIDURAL; INFILTRATION; INTRACAUDAL; PERINEURAL PRN
Status: DISCONTINUED | OUTPATIENT
Start: 2024-05-28 | End: 2024-05-28 | Stop reason: ALTCHOICE

## 2024-05-28 RX ORDER — GLIMEPIRIDE 1 MG/1
1 TABLET ORAL
COMMUNITY

## 2024-05-28 RX ORDER — FENTANYL CITRATE 50 UG/ML
25 INJECTION, SOLUTION INTRAMUSCULAR; INTRAVENOUS EVERY 5 MIN PRN
Status: DISCONTINUED | OUTPATIENT
Start: 2024-05-28 | End: 2024-05-28 | Stop reason: HOSPADM

## 2024-05-28 RX ORDER — EPINEPHRINE 1 MG/ML
INJECTION, SOLUTION INTRAMUSCULAR; SUBCUTANEOUS
Status: DISCONTINUED
Start: 2024-05-28 | End: 2024-05-28 | Stop reason: WASHOUT

## 2024-05-28 RX ORDER — CYCLOBENZAPRINE HCL 5 MG
5 TABLET ORAL 3 TIMES DAILY PRN
Qty: 30 TABLET | Refills: 0 | Status: SHIPPED | OUTPATIENT
Start: 2024-05-28 | End: 2024-06-07

## 2024-05-28 RX ORDER — SODIUM CHLORIDE 9 MG/ML
INJECTION, SOLUTION INTRAVENOUS PRN
Status: DISCONTINUED | OUTPATIENT
Start: 2024-05-28 | End: 2024-05-28 | Stop reason: HOSPADM

## 2024-05-28 RX ORDER — BUPIVACAINE HYDROCHLORIDE 5 MG/ML
INJECTION, SOLUTION EPIDURAL; INTRACAUDAL
Status: DISCONTINUED
Start: 2024-05-28 | End: 2024-05-28 | Stop reason: WASHOUT

## 2024-05-28 RX ORDER — LOSARTAN POTASSIUM 25 MG/1
25 TABLET ORAL DAILY
COMMUNITY

## 2024-05-28 RX ORDER — OXYCODONE HYDROCHLORIDE AND ACETAMINOPHEN 5; 325 MG/1; MG/1
1 TABLET ORAL EVERY 6 HOURS PRN
Qty: 28 TABLET | Refills: 0 | Status: SHIPPED | OUTPATIENT
Start: 2024-05-28 | End: 2024-06-04

## 2024-05-28 RX ORDER — SODIUM CHLORIDE 0.9 % (FLUSH) 0.9 %
5-40 SYRINGE (ML) INJECTION EVERY 12 HOURS SCHEDULED
Status: DISCONTINUED | OUTPATIENT
Start: 2024-05-28 | End: 2024-05-28 | Stop reason: HOSPADM

## 2024-05-28 RX ORDER — HYDROMORPHONE HYDROCHLORIDE 1 MG/ML
0.5 INJECTION, SOLUTION INTRAMUSCULAR; INTRAVENOUS; SUBCUTANEOUS EVERY 5 MIN PRN
Status: DISCONTINUED | OUTPATIENT
Start: 2024-05-28 | End: 2024-05-28 | Stop reason: HOSPADM

## 2024-05-28 RX ORDER — METOCLOPRAMIDE HYDROCHLORIDE 5 MG/ML
10 INJECTION INTRAMUSCULAR; INTRAVENOUS
Status: DISCONTINUED | OUTPATIENT
Start: 2024-05-28 | End: 2024-05-28 | Stop reason: HOSPADM

## 2024-05-28 RX ORDER — SODIUM CHLORIDE, SODIUM LACTATE, POTASSIUM CHLORIDE, CALCIUM CHLORIDE 600; 310; 30; 20 MG/100ML; MG/100ML; MG/100ML; MG/100ML
INJECTION, SOLUTION INTRAVENOUS CONTINUOUS
Status: DISCONTINUED | OUTPATIENT
Start: 2024-05-28 | End: 2024-05-28 | Stop reason: HOSPADM

## 2024-05-28 RX ORDER — PROPOFOL 10 MG/ML
INJECTION, EMULSION INTRAVENOUS PRN
Status: DISCONTINUED | OUTPATIENT
Start: 2024-05-28 | End: 2024-05-28 | Stop reason: SDUPTHER

## 2024-05-28 RX ORDER — DONEPEZIL HYDROCHLORIDE 5 MG/1
5 TABLET, FILM COATED ORAL NIGHTLY
COMMUNITY

## 2024-05-28 RX ORDER — LIDOCAINE HYDROCHLORIDE 10 MG/ML
1 INJECTION, SOLUTION EPIDURAL; INFILTRATION; INTRACAUDAL; PERINEURAL
Status: DISCONTINUED | OUTPATIENT
Start: 2024-05-28 | End: 2024-05-28 | Stop reason: HOSPADM

## 2024-05-28 RX ADMIN — METOPROLOL TARTRATE 2.5 MG: 5 INJECTION INTRAVENOUS at 14:46

## 2024-05-28 RX ADMIN — FENTANYL CITRATE 25 MCG: 50 INJECTION INTRAMUSCULAR; INTRAVENOUS at 16:19

## 2024-05-28 RX ADMIN — FENTANYL CITRATE 50 MCG: 50 INJECTION INTRAMUSCULAR; INTRAVENOUS at 15:03

## 2024-05-28 RX ADMIN — HYDROMORPHONE HYDROCHLORIDE 0.5 MG: 1 INJECTION, SOLUTION INTRAMUSCULAR; INTRAVENOUS; SUBCUTANEOUS at 15:56

## 2024-05-28 RX ADMIN — Medication 2000 MG: at 14:09

## 2024-05-28 RX ADMIN — DEXAMETHASONE SODIUM PHOSPHATE 10 MG: 10 INJECTION, SOLUTION INTRAMUSCULAR; INTRAVENOUS at 14:19

## 2024-05-28 RX ADMIN — SODIUM CHLORIDE, POTASSIUM CHLORIDE, SODIUM LACTATE AND CALCIUM CHLORIDE: 600; 310; 30; 20 INJECTION, SOLUTION INTRAVENOUS at 12:45

## 2024-05-28 RX ADMIN — LIDOCAINE HYDROCHLORIDE 60 MG: 20 INJECTION, SOLUTION EPIDURAL; INFILTRATION; INTRACAUDAL; PERINEURAL at 14:14

## 2024-05-28 RX ADMIN — FENTANYL CITRATE 50 MCG: 50 INJECTION INTRAMUSCULAR; INTRAVENOUS at 14:14

## 2024-05-28 RX ADMIN — ONDANSETRON 4 MG: 2 INJECTION INTRAMUSCULAR; INTRAVENOUS at 15:19

## 2024-05-28 RX ADMIN — PROPOFOL 150 MG: 10 INJECTION, EMULSION INTRAVENOUS at 14:14

## 2024-05-28 RX ADMIN — FENTANYL CITRATE 50 MCG: 50 INJECTION INTRAMUSCULAR; INTRAVENOUS at 14:30

## 2024-05-28 RX ADMIN — METOPROLOL TARTRATE 2.5 MG: 5 INJECTION INTRAVENOUS at 14:52

## 2024-05-28 RX ADMIN — HYDROMORPHONE HYDROCHLORIDE 0.5 MG: 1 INJECTION, SOLUTION INTRAMUSCULAR; INTRAVENOUS; SUBCUTANEOUS at 15:46

## 2024-05-28 RX ADMIN — FENTANYL CITRATE 50 MCG: 50 INJECTION INTRAMUSCULAR; INTRAVENOUS at 14:41

## 2024-05-28 ASSESSMENT — PAIN DESCRIPTION - ORIENTATION
ORIENTATION: LEFT
ORIENTATION: LEFT

## 2024-05-28 ASSESSMENT — PAIN DESCRIPTION - DESCRIPTORS
DESCRIPTORS: ACHING;SORE
DESCRIPTORS: ACHING;SORE

## 2024-05-28 ASSESSMENT — PAIN SCALES - GENERAL
PAINLEVEL_OUTOF10: 2
PAINLEVEL_OUTOF10: 8
PAINLEVEL_OUTOF10: 8
PAINLEVEL_OUTOF10: 1
PAINLEVEL_OUTOF10: 6

## 2024-05-28 ASSESSMENT — PAIN - FUNCTIONAL ASSESSMENT
PAIN_FUNCTIONAL_ASSESSMENT: FACE, LEGS, ACTIVITY, CRY, AND CONSOLABILITY (FLACC)
PAIN_FUNCTIONAL_ASSESSMENT: 0-10

## 2024-05-28 ASSESSMENT — PAIN DESCRIPTION - LOCATION
LOCATION: KNEE
LOCATION: KNEE

## 2024-05-28 NOTE — OP NOTE
reviewed to ensure appropriate accuracy and signatures.  The patient's left lower extremity is marked for surgery.  He was wheeled to the operating room and general anesthesia was induced by the anesthesia team without difficulty.  He did receive 2 g of IV Ancef for perioperative surgical prophylaxis.  The left lower extremity was prepped and draped in a sterile fashion.  At this point a timeout was performed with all members in the room in agreement of the patient, procedure, and the correct operative extremity.    Incision was made over the distal quadriceps tendon traversing over the superior patella.  This was done with a 10 blade scalpel.  We incised down to level of the quadriceps tendon.  There was complete quadriceps tendon rupture as well as retinaculum rupture.  At this point the distal quadricep tendon ends were trimmed to level subcu tissue.  The superior patella was prepared with a curette for eventual anchoring for cement.  We utilized #2 FiberWire in a [place] stitch fashion on both the medial and lateral borders of the quadriceps tendon.  With the knee extension we are able to evaluate this would have appropriate seating within the superior border of the patella.  At this point we drilled and tapped for our 4.75 mm Arthrex anchors.  Keeping the knee in extension the medial limbs were inserted into the medial drill hole and sequentially we inserted the lateral limbs into the lateral drill hole.  After anchor insertion we did slightly flexed the knee which demonstrated appropriate seating of the suture anchors with no loosening of the repair site.  The remaining suture from each anchor was then utilized for medial and lateral retinacular repairs.  At this point the wound was copiously irrigated with normal saline.  The wound was closed in a layered fashion with 0 and 2-0 Vicryl suture as well as 2-0 and 3-0 STRATAFIX suture in a layered closure.  15 cc of quarter percent Marcaine with epinephrine was

## 2024-05-28 NOTE — DISCHARGE INSTRUCTIONS
Orthopaedic Instructions:  -Weight bearing status: Weight bearing as tolerated with the left leg in full extension (straight).  -Keep knee brace in full extension at all times.  -Do not remove Optifoam bandage (the large sealed \"Band-aid\"-like dressing) until your follow-up date in clinic. It is okay to shower with the Optifoam bandage. Should it fall off, replace with band-aids or gauze & tape until dry. It is still okay to shower if it falls off, but avoid baths and underwater submersion.  -Ice (20 minutes on and off 1 hour) and elevate to reduce swelling and throbbing pain.  -Should urinate within 8 hours of surgery.  -Call the office or come to Emergency Room if signs of infection appear (hot, swollen, red, draining pus, fever)  -Take medications as prescribed.  -Wean off narcotics (percocet/norco) as soon as possible. Do not take tylenol if still taking narcotics.  -Follow up with Dr. Blas's office on 6/13/24 at 1:30pm. Call [phone]  to schedule/confirm or with any questions/concerns.

## 2024-05-28 NOTE — ANESTHESIA POSTPROCEDURE EVALUATION
Department of Anesthesiology  Postprocedure Note    Patient: Stevan Dolan  MRN: [id number]  YOB: 1940  Date of evaluation: 5/28/2024    Procedure Summary       Date: 05/28/24 Room / Location: [address]    Anesthesia Start: 1409 Anesthesia Stop: 1540    Procedure: LEFT QUADRICEPS REPAIR (Left: Thigh) Diagnosis:       Rupture of left quadriceps muscle, initial encounter      (Rupture of left quadriceps muscle, initial encounter [S76.112A])    Surgeons: Moises Blas DO Responsible Provider: Gino Helm MD    Anesthesia Type: general ASA Status: 3            Anesthesia Type: No value filed.    Hsira Phase I: Shira Score: 9    Shira Phase II:      Anesthesia Post Evaluation    Patient location during evaluation: PACU  Patient participation: complete - patient participated  Level of consciousness: awake  Airway patency: patent  Nausea & Vomiting: no nausea  Cardiovascular status: blood pressure returned to baseline  Respiratory status: acceptable  Hydration status: euvolemic  Comments: Multimodal analgesia pain management as indicated by procedure  Multimodal analgesia pain management approach  Pain management: adequate    No notable events documented.

## 2024-05-28 NOTE — ANESTHESIA PRE PROCEDURE
last solid food consumption: 05/27/24    BMI:   Wt Readings from Last 3 Encounters:   05/23/24 76.7 kg (169 lb)   05/22/24 76.8 kg (169 lb 6.4 oz)   04/10/24 80.2 kg (176 lb 14.4 oz)     There is no height or weight on file to calculate BMI.    CBC:   Lab Results   Component Value Date/Time    WBC 6.1 04/09/2024 08:25 AM    RBC 4.51 04/09/2024 08:25 AM    HGB 13.9 04/09/2024 08:25 AM    HCT 42.1 04/09/2024 08:25 AM    MCV 93.3 04/09/2024 08:25 AM    RDW 13.1 04/09/2024 08:25 AM     04/09/2024 08:25 AM       CMP:   Lab Results   Component Value Date/Time     04/09/2024 08:25 AM    K 4.9 04/09/2024 08:25 AM     04/09/2024 08:25 AM    CO2 24 04/09/2024 08:25 AM    BUN 25 04/09/2024 08:25 AM    CREATININE 1.6 04/09/2024 08:25 AM    LABGLOM 42 04/09/2024 08:25 AM    GLUCOSE 111 04/09/2024 08:25 AM    CALCIUM 9.3 04/09/2024 08:25 AM    BILITOT 0.6 04/09/2024 08:25 AM    ALKPHOS 113 04/09/2024 08:25 AM    AST 29 04/09/2024 08:25 AM    ALT 18 04/09/2024 08:25 AM       POC Tests: No results for input(s): \"POCGLU\", \"POCNA\", \"POCK\", \"POCCL\", \"POCBUN\", \"POCHEMO\", \"POCHCT\" in the last 72 hours.    Coags: No results found for: \"PROTIME\", \"INR\", \"APTT\"    HCG (If Applicable): No results found for: \"PREGTESTUR\", \"PREGSERUM\", \"HCG\", \"HCGQUANT\"     ABGs: No results found for: \"PHART\", \"PO2ART\", \"CKM4ATU\", \"NJH6OJX\", \"BEART\", \"A6PFWHQU\"     Type & Screen (If Applicable):  No results found for: \"LABABO\"    Anesthesia Evaluation  Patient summary reviewed   no history of anesthetic complications:   Airway: Mallampati: II          Dental:    (+) upper dentures      Pulmonary:Negative Pulmonary ROS and normal exam  breath sounds clear to auscultation      (-) COPD, asthma and sleep apnea                           Cardiovascular:Negative CV ROS  Exercise tolerance: good (>4 METS)  (+) hypertension:    (-) past MI, CAD and  angina      Rhythm: regular  Rate: normal                    Neuro/Psych:   Negative Neuro/Psych

## 2024-05-28 NOTE — H&P
Interval H&P Note    Pt Name: Stevan Dolan  MRN: [id number]  YOB: 1940  Date of evaluation: 5/28/2024      [x] I have reviewed in Epic the orthopedic surgery progress note by Whitney Kraft PA-C dated 05/23/2024, attached below for an Interval History and Physical note.     [x] I have examined  Stevan Dolan, a 83 y.o. male.There are no changes to the patient who is scheduled for LEFT QUADRICEPS REPAIR by Moises Blas DO for Rupture of left quadriceps muscle, initial encounter. The patient denies new health changes, fever, chills, wheezing, cough, increased SOB, chest pain, open sores or wounds. Hx of diabetes, POC . Denies any current blood thinning medications.     Vital signs: BP (!) 174/92   Pulse 70   Temp 97.2 °F (36.2 °C)   Resp 16   SpO2 99%     Allergies:  Mixed ragweed, Sulfa antibiotics, and Sulfamethoxazole-trimethoprim    Medications:    Prior to Admission medications    Not on File         This is a 83 y.o. male who is pleasant, cooperative, alert and oriented x3, in no acute distress. Patient is a poor historian.     Heart: Heart sounds are normal.  HR 70 regular rate and rhythm without murmur, gallop or rub.   Lungs: Normal respiratory effort with equal expansion, good air exchange, unlabored and clear to auscultation without wheezes or rales bilaterally   Abdomen: soft, nontender, nondistended with bowel sounds active.   Extremities: LLE in locked knee brace with ACE bandage. pedal pulses palpable with no pedal edema or calf tenderness bilaterally.    Labs:  No results for input(s): \"HGB\", \"HCT\", \"WBC\", \"MCV\", \"PLT\", \"NA\", \"K\", \"CL\", \"CO2\", \"BUN\", \"CREATININE\", \"GLUCOSE\", \"INR\", \"PROTIME\", \"APTT\", \"AST\", \"ALT\", \"LABALBU\", \"HCG\" in the last 720 hours.    No results for input(s): \"COVID19\" in the last 720 hours.    BURAK Leon CNP  Electronically signed 5/28/2024 at 12:32 PM      [facility], [facility]

## 2024-05-28 NOTE — BRIEF OP NOTE
Brief Postoperative Note      Patient: Stevan Dolan  YOB: 1940  MRN: [id number]    Date of Procedure: 5/28/2024    Pre-Op Diagnosis Codes:     * Rupture of left quadriceps muscle, initial encounter [S76.112A]    Post-Op Diagnosis:   1. Left quadriceps tendon rupture       Procedure(s):  Left quadriceps tendon repair  Left knee retinacular repair    Surgeon(s):  Moises Blas DO    Assistant:  Resident: Linus Cardona DO; Mandy Will DO    Anesthesia: General    Estimated Blood Loss (mL): 20    Fluids: 1L crystalloids    Tourniquet Time: n/a    Complications: None    Specimens:   * No specimens in log *    Implants:  Implant Name Type Inv. Item Serial No.  Lot No. LRB No. Used Action   SYSTEM FIX LIGMNT AUG REP JUMPSTART DRSG SWIVELOCK - [id number]  SYSTEM FIX LIGMNT AUG REP JUMPSTART DRSG SWIVELOCK  [employer]  Left 1 Implanted         Drains: * No LDAs found *    Findings:  Infection Present At Time Of Surgery (PATOS) (choose all levels that have infection present):  No infection present  Other Findings: Left quadriceps tendon rupture. See op note for details.    Electronically signed by Linus Cardona DO on 5/28/2024 at 3:32 PM

## 2024-06-07 ENCOUNTER — TELEPHONE (OUTPATIENT)
Dept: ORTHOPEDIC SURGERY | Age: 84
End: 2024-06-07

## 2024-06-07 ENCOUNTER — TELEPHONE (OUTPATIENT)
Age: 84
End: 2024-06-07

## 2024-06-07 NOTE — TELEPHONE ENCOUNTER
Patient had left quadriceps repair 5/28/24  Would you like an appointment to have patient educated on his brace

## 2024-06-07 NOTE — TELEPHONE ENCOUNTER
Pt's daughter called in regarding recent surg with Dr. Blas 05/28/24 states her dad is not wearing his brace correctly, states he isn't locking it down and not sure even how often he is doing that.       Not sure what she should do and just needs so advise to help her dad.      Please call Immanuel, who is on HIPAA, back at [phone]

## 2024-06-07 NOTE — TELEPHONE ENCOUNTER
PT daughter called with concern for ananth. He fell and ruptured his quad and had surgery in may on it. Daughter feels he needs to get seen sooner than 7/11/24 as he is not wearing his brace and is not following medical treatment advice. He has dementia and it has been increasingly harder to tend to him.

## 2024-06-10 NOTE — TELEPHONE ENCOUNTER
Mandy Ji PA-C   to Me       6/10/24  7:41 AM   Whitney is out of office, but this patient's brace needs to be locked in extension. If they are unsure how to do so please bring them in for a nurse visit to teach them proper brace fitting.    The patient has a follow up on 6/13/2024 with Whitney.    -Mandy  I spoke with Daughter Immanuel, she stated the patient and the daughter know how to lock the brace, it is he is very noncompliant and unlocks the brace and even takes it off.  She was just calling ahead of time to inform Whitney FLORES as he has an upcoming appointment with her that he is not being compliant.

## 2024-06-13 ENCOUNTER — OFFICE VISIT (OUTPATIENT)
Dept: ORTHOPEDIC SURGERY | Age: 84
End: 2024-06-13

## 2024-06-13 VITALS — BODY MASS INDEX: 23.03 KG/M2 | RESPIRATION RATE: 14 BRPM | HEIGHT: 72 IN | WEIGHT: 170 LBS

## 2024-06-13 DIAGNOSIS — S76.112D RUPTURE OF LEFT QUADRICEPS TENDON, SUBSEQUENT ENCOUNTER: ICD-10-CM

## 2024-06-13 DIAGNOSIS — M25.562 ACUTE PAIN OF LEFT KNEE: Primary | ICD-10-CM

## 2024-06-13 ASSESSMENT — ENCOUNTER SYMPTOMS
COLOR CHANGE: 0
VOMITING: 0
NAUSEA: 0

## 2024-06-13 NOTE — PATIENT INSTRUCTIONS
-Keep the knee locked in extension at all times.     -If removing the brace, do not bend the knee.     -Work on straight leg raises 10-15 repetitions multiple times per day to activate quadricep muscles.

## 2024-06-13 NOTE — PROGRESS NOTES
daughter with information for Adult Protective Services.  I also encouraged her to speak with the patient's family doctor about her concerns.  They may need  to do a home eval to assess the patient's safety and wellbeing. The patient is scheduled to see his PCP next week.    In the meantime the patient and the family were instructed to keep the patient's operative leg locked in extension at all times.  If he does remove the brace, he should not bend the knee.  He may weight-bear as tolerated as long as the knee is locked in extension. He was also instructed on completing straight leg raises with the brace on. He should also not soak or submerge the surgical incision.  He may shower.  The brace should be locked in extension until the patient is 4 weeks out from the date of surgery.  At that time we will adjust the settings to 0 to 30 degrees.  The patient will follow-up with us again in 2 weeks for his 4-week follow-up.       Follow up: Return in about 2 weeks (around 6/27/2024) for post-op.    No orders of the defined types were placed in this encounter.      No orders of the defined types were placed in this encounter.      This note is created with the assistance of a speech recognition program.  While intending to generate a document that actually reflects the content of the visit, the document can still have some errors including those of syntax and sound a like substitutions which may escape proof reading.  In such instances, actual meaning can be extrapolated by contextual diversion.     Electronically signed by Whitney Kraft PA-C on 6/13/2024 at 2:09 PM

## 2024-06-19 ENCOUNTER — OFFICE VISIT (OUTPATIENT)
Age: 84
End: 2024-06-19
Payer: MEDICARE

## 2024-06-19 ENCOUNTER — TELEPHONE (OUTPATIENT)
Age: 84
End: 2024-06-19

## 2024-06-19 VITALS
WEIGHT: 167.8 LBS | SYSTOLIC BLOOD PRESSURE: 140 MMHG | TEMPERATURE: 97.7 F | HEART RATE: 68 BPM | HEIGHT: 72 IN | OXYGEN SATURATION: 97 % | BODY MASS INDEX: 22.73 KG/M2 | DIASTOLIC BLOOD PRESSURE: 90 MMHG

## 2024-06-19 DIAGNOSIS — R41.3 MEMORY DYSFUNCTION: ICD-10-CM

## 2024-06-19 DIAGNOSIS — S76.112D QUADRICEPS TENDON RUPTURE, LEFT, SUBSEQUENT ENCOUNTER: Primary | ICD-10-CM

## 2024-06-19 DIAGNOSIS — E11.22 TYPE 2 DIABETES MELLITUS WITH STAGE 2 CHRONIC KIDNEY DISEASE, WITHOUT LONG-TERM CURRENT USE OF INSULIN (HCC): ICD-10-CM

## 2024-06-19 DIAGNOSIS — N18.2 TYPE 2 DIABETES MELLITUS WITH STAGE 2 CHRONIC KIDNEY DISEASE, WITHOUT LONG-TERM CURRENT USE OF INSULIN (HCC): ICD-10-CM

## 2024-06-19 PROBLEM — C64.2: Status: RESOLVED | Noted: 2023-09-13 | Resolved: 2024-06-19

## 2024-06-19 PROBLEM — C64.2 CLEAR CELL CARCINOMA OF KIDNEY, LEFT (HCC): Status: RESOLVED | Noted: 2020-09-11 | Resolved: 2024-06-19

## 2024-06-19 PROBLEM — I73.9 PERIPHERAL VASCULAR DISEASE (HCC): Status: RESOLVED | Noted: 2023-11-13 | Resolved: 2024-06-19

## 2024-06-19 PROCEDURE — 3077F SYST BP >= 140 MM HG: CPT | Performed by: FAMILY MEDICINE

## 2024-06-19 PROCEDURE — 99214 OFFICE O/P EST MOD 30 MIN: CPT | Performed by: FAMILY MEDICINE

## 2024-06-19 PROCEDURE — 3080F DIAST BP >= 90 MM HG: CPT | Performed by: FAMILY MEDICINE

## 2024-06-19 PROCEDURE — 3051F HG A1C>EQUAL 7.0%<8.0%: CPT | Performed by: FAMILY MEDICINE

## 2024-06-19 PROCEDURE — G8427 DOCREV CUR MEDS BY ELIG CLIN: HCPCS | Performed by: FAMILY MEDICINE

## 2024-06-19 PROCEDURE — 1123F ACP DISCUSS/DSCN MKR DOCD: CPT | Performed by: FAMILY MEDICINE

## 2024-06-19 PROCEDURE — 1036F TOBACCO NON-USER: CPT | Performed by: FAMILY MEDICINE

## 2024-06-19 PROCEDURE — G8420 CALC BMI NORM PARAMETERS: HCPCS | Performed by: FAMILY MEDICINE

## 2024-06-19 NOTE — PROGRESS NOTES
MCV 93.3 04/09/2024     04/09/2024       Lab Results   Component Value Date    ALT 18 04/09/2024    AST 29 04/09/2024    ALKPHOS 113 04/09/2024    BILITOT 0.6 04/09/2024        Lab Results   Component Value Date    TSH 1.66 04/09/2024        REVIEW OF SYSTEM      Review of Systems  No reported fever no sweats no chills no chest pain or shortness of breath no nausea no vomiting no diarrhea no incontinence issues.  Patient does not have any pain issues involving the surgical repair    REVIEWED INFORMATION      Allergies   Allergen Reactions    Mixed Ragweed     Sulfa Antibiotics     Sulfamethoxazole-Trimethoprim Hives       Current Outpatient Medications   Medication Sig Dispense Refill    glimepiride (AMARYL) 1 MG tablet Take 1 tablet by mouth every morning (before breakfast)      metFORMIN (GLUCOPHAGE) 500 MG tablet Take 1 tablet by mouth 2 times daily (with meals)      pravastatin (PRAVACHOL) 40 MG tablet Take 1 tablet by mouth daily      losartan (COZAAR) 25 MG tablet Take 1 tablet by mouth daily      donepezil (ARICEPT) 5 MG tablet Take 1 tablet by mouth nightly      docusate sodium (COLACE) 100 MG capsule Take 1 capsule by mouth 2 times daily as needed for Constipation 20 capsule 0     No current facility-administered medications for this visit.        Patient Active Problem List   Diagnosis    Benign prostatic hyperplasia with urinary obstruction    Nocturia    Generalized anxiety disorder    Memory dysfunction    Cancer of prostate (HCC)    Diabetes mellitus without complication (HCC)    Mixed hyperlipidemia    Chronic kidney disease, stage II (mild)    Essential (primary) hypertension    Right renal mass    Allergic rhinitis    Caffeine use    Cancer (HCC)    Type 2 diabetes mellitus with chronic kidney disease    Moderate dementia without behavioral disturbance, psychotic disturbance, mood disturbance, or anxiety (HCC)    Alzheimer's disease, unspecified    Rupture of left quadriceps muscle

## 2024-06-19 NOTE — TELEPHONE ENCOUNTER
Amy/Kristen brought in bill from patient for his Medicare Wellness on 1/9/2024  States they have never received a bill for this and thought it is suppose to be covered.  Copied bill and put in you basket.   Ervinr's number [phone]

## 2024-06-24 NOTE — TELEPHONE ENCOUNTER
Looked up the claim. It looks like it was billed as  a initial AWV.   And it should of been billed as a .   Sending to dr. Gannon to edit the note/ CPT on note from 1/9/2024 and once he resigns the note it will get rebilled out to the insurance.   Called daughter and explained that all to her. She understood.

## 2024-06-27 ENCOUNTER — CARE COORDINATION (OUTPATIENT)
Dept: CARE COORDINATION | Age: 84
End: 2024-06-27

## 2024-06-27 DIAGNOSIS — R41.3 MEMORY DYSFUNCTION: ICD-10-CM

## 2024-06-27 DIAGNOSIS — S76.112D RUPTURE OF LEFT QUADRICEPS MUSCLE, SUBSEQUENT ENCOUNTER: Primary | ICD-10-CM

## 2024-06-27 NOTE — CARE COORDINATION
Was asked by pcp to set up pt for home care called his daughter and provided freedom of choice. They would like [facility] for home care will send home care order to pcp to sign then fax order the order to them

## 2024-07-02 ENCOUNTER — TELEPHONE (OUTPATIENT)
Age: 84
End: 2024-07-02

## 2024-07-02 NOTE — TELEPHONE ENCOUNTER
Ted from PT is calling due to having a referral placed he wants a verbal okay to see him once this week and then 2x weekly for 6 weeks. [phone]

## 2024-07-08 ENCOUNTER — TELEPHONE (OUTPATIENT)
Age: 84
End: 2024-07-08

## 2024-07-08 NOTE — TELEPHONE ENCOUNTER
Radha is calling from [facility] Patient needs verbal order for OT four weeks once a week.  [phone]

## 2024-07-11 ENCOUNTER — OFFICE VISIT (OUTPATIENT)
Dept: ORTHOPEDIC SURGERY | Age: 84
End: 2024-07-11

## 2024-07-11 VITALS — RESPIRATION RATE: 15 BRPM | WEIGHT: 168 LBS | BODY MASS INDEX: 22.75 KG/M2 | HEIGHT: 72 IN

## 2024-07-11 DIAGNOSIS — S76.112D RUPTURE OF LEFT QUADRICEPS TENDON, SUBSEQUENT ENCOUNTER: Primary | ICD-10-CM

## 2024-07-11 PROCEDURE — 99024 POSTOP FOLLOW-UP VISIT: CPT

## 2024-07-11 ASSESSMENT — ENCOUNTER SYMPTOMS
COLOR CHANGE: 0
NAUSEA: 0
VOMITING: 0

## 2024-07-11 NOTE — PROGRESS NOTES
office.  Ortho Exam  MS:   Examination of the left knee demonstrates that the incision is well-healed and there is no erythema, ecchymosis, or soft tissue swelling.  There is a mild supra-patellar effusion.  There is no palpable defect along the quadriceps tendon.  There is additionally no pain to palpation throughout the knee.  He is able to complete a straight leg raise.  There is atrophy of the quadriceps muscles.  The calf is soft and nontender and Homans is negative.  Motor, sensory, and vascular function is grossly intact with no focal deficits.    Neuro: alert. oriented  Eyes: Extra-ocular muscles intact  Mouth: Oral mucosa moist. No perioral lesions  Pulm: Respirations unlabored and regular.  Skin: warm, well perfused  Psych:   Patient has good fund of knowledge and displays understanging of exam, diagnosis, and plan.    Radiology:    Narrative & Impression  EXAMINATION:  MRI OF THE LEFT KNEE WITHOUT CONTRAST, 5/24/2024 10:28 am     TECHNIQUE:  Multiplanar multisequence MRI of the left knee was performed without the  administration of intravenous contrast.     COMPARISON:  Left knee radiographs from 05/22/2024.     HISTORY:  ORDERING SYSTEM PROVIDED HISTORY: Effusion of left knee  TECHNOLOGIST PROVIDED HISTORY:  suspect rupture of quadriceps tendon  Reason for Exam: suspect rupture of quadriceps tendon     83-year-old male with left knee effusion; suspect rupture of quadriceps  tendon.     FINDINGS:  MENISCI: Complex multidirectional tearing throughout the lateral and medial  meniscus.     CRUCIATE LIGAMENTS: Mucoid degeneration and fluid extending through the  fibers of the ACL.  ACL fibers appear continuous.  Findings likely represent  severe ACL ganglion formation and mucoid degeneration.     Severe thinning of the posterior cruciate ligament concerning for  age-indeterminate partial tearing of the PCL.     EXTENSOR MECHANISM: Mild multifocal patellar tendinosis.     Distal quadriceps tendon rupture with

## 2024-07-18 ENCOUNTER — TELEPHONE (OUTPATIENT)
Dept: ORTHOPEDIC SURGERY | Age: 84
End: 2024-07-18

## 2024-07-18 NOTE — TELEPHONE ENCOUNTER
Received call from patients daughter Kristen requesting a message be sent to Whitney stating pt has not been cleared to drive but found out last night that patient has been driving for the last few weeks. Stated pt has dementia and his wife wants him to drive. Daughter would like to know if this is safe or if Whitney thinks this is unsafe.    Please advise.    Kristen call back: [phone]

## 2024-07-24 RX ORDER — LOSARTAN POTASSIUM 25 MG/1
25 TABLET ORAL DAILY
Qty: 30 TABLET | Refills: 5 | Status: SHIPPED | OUTPATIENT
Start: 2024-07-24

## 2024-07-24 NOTE — TELEPHONE ENCOUNTER
Stevan Dolan is calling to request a refill on the following medication(s):    Medication Request:  Requested Prescriptions     Pending Prescriptions Disp Refills    losartan (COZAAR) 25 MG tablet [Pharmacy Med Name: LOSARTAN POTASSIUM 25 MG TAB] 30 tablet      Sig: TAKE 1 TABLET BY MOUTH DAILY       Last Visit Date (If Applicable):  6/19/2024    Next Visit Date:    8/27/2024

## 2024-08-05 ENCOUNTER — OFFICE VISIT (OUTPATIENT)
Dept: ORTHOPEDIC SURGERY | Age: 84
End: 2024-08-05

## 2024-08-05 VITALS — WEIGHT: 168 LBS | RESPIRATION RATE: 16 BRPM | BODY MASS INDEX: 22.75 KG/M2 | HEIGHT: 72 IN

## 2024-08-05 DIAGNOSIS — S76.112D RUPTURE OF LEFT QUADRICEPS TENDON, SUBSEQUENT ENCOUNTER: Primary | ICD-10-CM

## 2024-08-05 PROCEDURE — 99024 POSTOP FOLLOW-UP VISIT: CPT

## 2024-08-05 ASSESSMENT — ENCOUNTER SYMPTOMS: COLOR CHANGE: 0

## 2024-08-05 NOTE — PROGRESS NOTES
[facility]  [id number] [facility]  [address] [place], [place] [id number]  Dept: [phone]  Dept Fax: [phone]        Postoperative follow-up note    Subjective:   Stevan Dolan is a 83 y.o. year old male who presents to our office today for postoperative followup regarding his   1. Rupture of left quadriceps tendon, subsequent encounter    .    Chief Complaint   Patient presents with    Knee Pain     Left Knee DOS: 05/28/2024         Date of Surgery: 05/28/24    Stevna Dolan  is a 83 y.o. year old male who presents to our office today for postoperative follow up after having undergone a left knee quadriceps tendon repair on 05/28/24. He is now 10 weeks out from the date of the surgery. He states that he currently has no pain. He reports that he occasionally has discomfort. He has continued to work with home physical therapy at home. He also states that he has been wearing his brace, but not at night.  Per chart review, the patient did have a fall last week where he tripped over his shoelaces and fell onto the right knee.  He denies having any pain or increased instability after the fall.    Review of Systems   Musculoskeletal:  Positive for arthralgias. Negative for gait problem and joint swelling.   Skin:  Positive for wound. Negative for color change.        Superficial abrasion along anterior aspect of right knee.    Neurological:  Negative for weakness and numbness.           Objective :   General: Stevan Dolan is a 83 y.o. male who is alert and oriented and sitting comfortably in our office.  Ortho Exam  MS:   The patient walks with a heel-to-toe gait pattern without antalgia.  He walks without a walking aid.  Examination of the left knee demonstrates that the skin is intact and there is no erythema, ecchymosis, or soft tissue swelling.  There is no knee effusion.  There is no visible or palpable defect.

## 2024-08-05 NOTE — PATIENT INSTRUCTIONS
-Keep the brace on for 4 more weeks.    -Keep working on the leg lift exercises    -Follow up in 6 weeks for one last follow up visit.

## 2024-08-06 ENCOUNTER — TELEPHONE (OUTPATIENT)
Dept: ORTHOPEDIC SURGERY | Age: 84
End: 2024-08-06

## 2024-08-06 NOTE — TELEPHONE ENCOUNTER
Johan [facility] called to find out if patient is allowed to progress with any exercises with physical therapy.  Please return her call [phone] to let her know.  Thanks.

## 2024-08-08 NOTE — TELEPHONE ENCOUNTER
Johan from [facility] Pt was notified. Johan voiced understanding. She stated shed remind pt to schedule follow up.

## (undated) DEVICE — DRAPE,U/ SHT,SPLIT,PLAS,STERIL: Brand: MEDLINE

## (undated) DEVICE — SUTURE FIBERWIRE SZ 2 W/ TAPERED NEEDLE BLUE L38IN NONABSORB BLU L26.5MM 1/2 CIRCLE AR7200

## (undated) DEVICE — PADDING CAST W6INXL4YD COT LO LINTING WYTEX

## (undated) DEVICE — BANDAGE COBAN 6 IN WND 6INX5YD FOAM

## (undated) DEVICE — 3M™ IOBAN™ 2 ANTIMICROBIAL INCISE DRAPE 6650EZ: Brand: IOBAN™ 2

## (undated) DEVICE — GAUZE,SPONGE,FLUFF,6"X6.75",STRL,5/TRAY: Brand: MEDLINE

## (undated) DEVICE — YANKAUER,FLEXIBLE HANDLE,REGLR CAPACITY: Brand: MEDLINE INDUSTRIES, INC.

## (undated) DEVICE — LIQUIBAND RAPID ADHESIVE 36/CS 0.8ML: Brand: MEDLINE

## (undated) DEVICE — SUTURE FIBERLOOP SZ 2-0 L20IN NONABSORBABLE BLU STR NDL AR7234

## (undated) DEVICE — TOWEL,OR,DSP,ST,BLUE,DLX,XR,4/PK,20PK/CS: Brand: MEDLINE

## (undated) DEVICE — Z DISCONTINUED USE 2220147 SUTURE VCRL SZ 0 L27IN ABSRB UD L26MM CT-2 1/2 CIR J270H

## (undated) DEVICE — GLOVE SURG SZ 85 CRM LTX FREE POLYISOPRENE POLYMER BEAD ANTI

## (undated) DEVICE — ELECTRODE PT RET AD L9FT HI MOIST COND ADH HYDRGEL CORDED

## (undated) DEVICE — DRAPE,REIN 53X77,STERILE: Brand: MEDLINE

## (undated) DEVICE — 4-PORT MANIFOLD: Brand: NEPTUNE 2

## (undated) DEVICE — PADDING UNDERCAST W6INXL4YD RAYON POLY SYN NONADHESIVE

## (undated) DEVICE — Device

## (undated) DEVICE — BNDG,ELSTC,MATRIX,STRL,6"X5YD,LF,HOOK&LP: Brand: MEDLINE

## (undated) DEVICE — FORCEPS BX L240CM WRK CHN 2.8MM STD CAP W/ NDL MIC MESH

## (undated) DEVICE — SUTURE ABSORBABLE MONOFILAMENT 2-0 CT-1 24 CM 36 MM VIO PDS+

## (undated) DEVICE — GOWN,AURORA,NON-REINFORCED,2XL: Brand: MEDLINE

## (undated) DEVICE — INTENDED FOR TISSUE SEPARATION, AND OTHER PROCEDURES THAT REQUIRE A SHARP SURGICAL BLADE TO PUNCTURE OR CUT.: Brand: BARD-PARKER ® CARBON RIB-BACK BLADES

## (undated) DEVICE — SUTURE VICRYL SZ 2-0 L27IN ABSRB UD L26MM CT-2 1/2 CIR J269H

## (undated) DEVICE — STAZ LOWER EXTREMITY: Brand: MEDLINE INDUSTRIES, INC.

## (undated) DEVICE — PAD,ABDOMINAL,5"X9",ST,LF,25/BX: Brand: MEDLINE INDUSTRIES, INC.

## (undated) DEVICE — SUTURE STRATAFIX SPRL SZ 3-0 L5IN ABSRB UD FS L26MM 3/8 CIR SXMP2B411

## (undated) DEVICE — DRESSING FOAM W4XL10IN AG SIL ADH ANTIMIC POSTOP OPTIFOAM

## (undated) DEVICE — STOCKINETTE,IMPERVIOUS,12X48,STERILE: Brand: MEDLINE